# Patient Record
Sex: FEMALE | Race: WHITE | NOT HISPANIC OR LATINO | Employment: UNEMPLOYED | URBAN - METROPOLITAN AREA
[De-identification: names, ages, dates, MRNs, and addresses within clinical notes are randomized per-mention and may not be internally consistent; named-entity substitution may affect disease eponyms.]

---

## 2019-11-04 ENCOUNTER — ANNUAL EXAM (OUTPATIENT)
Dept: OBGYN CLINIC | Facility: CLINIC | Age: 59
End: 2019-11-04
Payer: COMMERCIAL

## 2019-11-04 VITALS
BODY MASS INDEX: 25.27 KG/M2 | WEIGHT: 148 LBS | HEIGHT: 64 IN | DIASTOLIC BLOOD PRESSURE: 78 MMHG | SYSTOLIC BLOOD PRESSURE: 124 MMHG

## 2019-11-04 DIAGNOSIS — Z12.39 BREAST CANCER SCREENING: ICD-10-CM

## 2019-11-04 DIAGNOSIS — Z01.419 WOMEN'S ANNUAL ROUTINE GYNECOLOGICAL EXAMINATION: Primary | ICD-10-CM

## 2019-11-04 PROCEDURE — 99396 PREV VISIT EST AGE 40-64: CPT | Performed by: OBSTETRICS & GYNECOLOGY

## 2019-11-04 NOTE — PATIENT INSTRUCTIONS
The patient was informed of a stable menopausal gyn examination  She will make arrangements to go colonoscopy  Return my office next year  She should get a Pap smear on her next year's visit

## 2019-11-04 NOTE — PROGRESS NOTES
Assessment/Plan:    The patient was informed of a stable menopausal gyn examination  A Pap smear was not performed  Patient reminded to make arrangements for colonoscopy  Her mammograms are up-to-date  Return to office in 1 year  Subjective:      Patient ID: Chyna Nicole is a 66 y o  female  HPI  This is a 70-year-old white female, she is a  5 para 2  Two prior vaginal deliveries   She is now in menopause  Menopause symptoms are under control  Her prior method of contraception includes vasectomy  She is still sexually active  She denies any major gynecological  GI complaints  She is to make arrangements for colonoscopy  Her mammograms are up-to-date  She is happy with her weight  Denies depression  She has a dentist on a regular basis  There are no new major family illnesses report except for mother has no stage IV lymphoma  The following portions of the patient's history were reviewed and updated as appropriate: allergies, current medications, past family history, past medical history, past social history, past surgical history and problem list     Review of Systems   All other systems reviewed and are negative  Objective:      /78   Ht 5' 3 5" (1 613 m)   Wt 67 1 kg (148 lb)   BMI 25 81 kg/m²          Physical Exam   Constitutional: She is oriented to person, place, and time  She appears well-developed and well-nourished  HENT:   Head: Normocephalic and atraumatic  Eyes: EOM are normal    Neck: Normal range of motion  Neck supple  No thyromegaly present  Cardiovascular: Normal rate, regular rhythm and normal heart sounds  Pulmonary/Chest: Effort normal and breath sounds normal  No stridor  No respiratory distress  She has no wheezes  She has no rales  She exhibits no tenderness  Right breast exhibits no inverted nipple, no mass, no nipple discharge, no skin change and no tenderness   Left breast exhibits no inverted nipple, no mass, no nipple discharge, no skin change and no tenderness  No breast swelling, tenderness, discharge or bleeding  Breasts are symmetrical    Abdominal: Soft  Bowel sounds are normal  She exhibits no distension and no mass  There is no tenderness  There is no rebound and no guarding  No hernia  Hernia confirmed negative in the right inguinal area and confirmed negative in the left inguinal area  Genitourinary: Rectum normal, vagina normal and uterus normal  No labial fusion  There is no rash, tenderness, lesion or injury on the right labia  There is no rash, tenderness, lesion or injury on the left labia  Uterus is not deviated, not enlarged, not fixed and not tender  Cervix exhibits no motion tenderness, no discharge and no friability  Right adnexum displays no mass, no tenderness and no fullness  Left adnexum displays no mass, no tenderness and no fullness  No erythema, tenderness or bleeding in the vagina  No foreign body in the vagina  No signs of injury around the vagina  No vaginal discharge found  Genitourinary Comments: A Pap smear was not performed   Lymphadenopathy: No inguinal adenopathy noted on the right or left side  Neurological: She is alert and oriented to person, place, and time  Skin: Skin is warm and dry  Psychiatric: She has a normal mood and affect   Her behavior is normal

## 2020-01-02 ENCOUNTER — HOSPITAL ENCOUNTER (OUTPATIENT)
Dept: BONE DENSITY | Facility: IMAGING CENTER | Age: 60
Discharge: HOME | End: 2020-01-02
Payer: COMMERCIAL

## 2020-01-02 VITALS — HEIGHT: 63 IN | BODY MASS INDEX: 26.93 KG/M2 | WEIGHT: 152 LBS

## 2020-01-02 DIAGNOSIS — Z12.39 BREAST CANCER SCREENING: ICD-10-CM

## 2020-01-02 PROCEDURE — 77067 SCR MAMMO BI INCL CAD: CPT

## 2020-01-02 PROCEDURE — 77063 BREAST TOMOSYNTHESIS BI: CPT

## 2020-01-15 ENCOUNTER — OFFICE VISIT (OUTPATIENT)
Dept: OBGYN CLINIC | Facility: CLINIC | Age: 60
End: 2020-01-15
Payer: COMMERCIAL

## 2020-01-15 VITALS
BODY MASS INDEX: 25.68 KG/M2 | DIASTOLIC BLOOD PRESSURE: 80 MMHG | WEIGHT: 150.4 LBS | SYSTOLIC BLOOD PRESSURE: 144 MMHG | HEIGHT: 64 IN

## 2020-01-15 DIAGNOSIS — R10.31 RIGHT LOWER QUADRANT PAIN: Primary | ICD-10-CM

## 2020-01-15 PROCEDURE — 99214 OFFICE O/P EST MOD 30 MIN: CPT | Performed by: OBSTETRICS & GYNECOLOGY

## 2020-01-15 RX ORDER — MELATONIN
1000 DAILY
COMMUNITY

## 2020-01-15 NOTE — PROGRESS NOTES
This is a 57-year-old white female, she is a  5 para 2 with 2 prior vaginal deliveries  She is in menopause  She is seen today complaining of right lower quadrant pain for the last 2-3 weeks  She denies any fever chills diarrhea constipation  There is no associated with sexual activity  On a pain scale is 2/10  She has no history of abdominal pelvic surgery  She has noticed slight improvement after bowel movement  There are no  a complaints  Abdominal exam is soft, positive bowel sounds, no rebound, no guarding  There was only minimal discomfort a right lower quadrant with deep palpation  Pelvic exam the external genitalia normal limits the vagina is clean the cervix is small mobile nontender there is no cervical motion tenderness  The uterus is anterior normal size completely benign unable to reproduce any pain right lower quadrant  The adnexa clear bilaterally  There is no evidence of ovarian enlargement  Impression I do not believe this is gyn in origin  There may be a GI component  Will make arrangements for a transvaginal ultrasound    If this is negative as I suspect, she will make a consultation with the GI specialist

## 2020-01-15 NOTE — PATIENT INSTRUCTIONS
Right lower quadrant pain I doubt gyn origin will order a ultrasound of the pelvis  I believe this may be GI in origin she may need to see a GI specialist for follow-up she will be informed

## 2020-01-31 ENCOUNTER — HOSPITAL ENCOUNTER (OUTPATIENT)
Dept: ULTRASOUND IMAGING | Facility: HOSPITAL | Age: 60
Discharge: HOME | End: 2020-01-31
Payer: COMMERCIAL

## 2020-01-31 DIAGNOSIS — R10.31 RIGHT LOWER QUADRANT PAIN: ICD-10-CM

## 2020-01-31 PROCEDURE — 76856 US EXAM PELVIC COMPLETE: CPT

## 2020-01-31 PROCEDURE — 76830 TRANSVAGINAL US NON-OB: CPT

## 2020-02-05 ENCOUNTER — TELEPHONE (OUTPATIENT)
Dept: OBGYN CLINIC | Facility: CLINIC | Age: 60
End: 2020-02-05

## 2021-01-07 ENCOUNTER — APPOINTMENT (EMERGENCY)
Dept: RADIOLOGY | Facility: HOSPITAL | Age: 61
End: 2021-01-07
Payer: COMMERCIAL

## 2021-01-07 ENCOUNTER — HOSPITAL ENCOUNTER (EMERGENCY)
Facility: HOSPITAL | Age: 61
Discharge: HOME | End: 2021-01-07
Attending: EMERGENCY MEDICINE | Admitting: EMERGENCY MEDICINE
Payer: COMMERCIAL

## 2021-01-07 VITALS
SYSTOLIC BLOOD PRESSURE: 150 MMHG | BODY MASS INDEX: 25.75 KG/M2 | TEMPERATURE: 97 F | DIASTOLIC BLOOD PRESSURE: 76 MMHG | WEIGHT: 150 LBS | RESPIRATION RATE: 13 BRPM | HEART RATE: 64 BPM | OXYGEN SATURATION: 98 %

## 2021-01-07 DIAGNOSIS — R07.9 CHEST PAIN: Primary | ICD-10-CM

## 2021-01-07 DIAGNOSIS — E87.6 HYPOKALEMIA: ICD-10-CM

## 2021-01-07 DIAGNOSIS — R00.2 PALPITATIONS: ICD-10-CM

## 2021-01-07 LAB
ALBUMIN SERPL BCP-MCNC: 4.4 G/DL (ref 3.5–5)
ALP SERPL-CCNC: 79 U/L (ref 46–116)
ALT SERPL W P-5'-P-CCNC: 32 U/L (ref 12–78)
ANION GAP SERPL CALCULATED.3IONS-SCNC: 9 MMOL/L (ref 4–13)
AST SERPL W P-5'-P-CCNC: 25 U/L (ref 5–45)
ATRIAL RATE: 111 BPM
BASOPHILS # BLD AUTO: 0.11 THOUSANDS/ΜL (ref 0–0.1)
BASOPHILS NFR BLD AUTO: 1 % (ref 0–1)
BILIRUB SERPL-MCNC: 0.7 MG/DL (ref 0.2–1)
BUN SERPL-MCNC: 11 MG/DL (ref 5–25)
CALCIUM SERPL-MCNC: 9.7 MG/DL (ref 8.3–10.1)
CHLORIDE SERPL-SCNC: 102 MMOL/L (ref 100–108)
CO2 SERPL-SCNC: 28 MMOL/L (ref 21–32)
CREAT SERPL-MCNC: 0.88 MG/DL (ref 0.6–1.3)
D DIMER PPP FEU-MCNC: 0.32 UG/ML FEU
EOSINOPHIL # BLD AUTO: 0.03 THOUSAND/ΜL (ref 0–0.61)
EOSINOPHIL NFR BLD AUTO: 0 % (ref 0–6)
ERYTHROCYTE [DISTWIDTH] IN BLOOD BY AUTOMATED COUNT: 11.4 % (ref 11.6–15.1)
GFR SERPL CREATININE-BSD FRML MDRD: 73 ML/MIN/1.73SQ M
GLUCOSE SERPL-MCNC: 164 MG/DL (ref 65–140)
HCT VFR BLD AUTO: 45.6 % (ref 34.8–46.1)
HGB BLD-MCNC: 15.4 G/DL (ref 11.5–15.4)
IMM GRANULOCYTES # BLD AUTO: 0.02 THOUSAND/UL (ref 0–0.2)
IMM GRANULOCYTES NFR BLD AUTO: 0 % (ref 0–2)
LYMPHOCYTES # BLD AUTO: 2.99 THOUSANDS/ΜL (ref 0.6–4.47)
LYMPHOCYTES NFR BLD AUTO: 35 % (ref 14–44)
MCH RBC QN AUTO: 33.1 PG (ref 26.8–34.3)
MCHC RBC AUTO-ENTMCNC: 33.8 G/DL (ref 31.4–37.4)
MCV RBC AUTO: 98 FL (ref 82–98)
MONOCYTES # BLD AUTO: 0.78 THOUSAND/ΜL (ref 0.17–1.22)
MONOCYTES NFR BLD AUTO: 9 % (ref 4–12)
NEUTROPHILS # BLD AUTO: 4.54 THOUSANDS/ΜL (ref 1.85–7.62)
NEUTS SEG NFR BLD AUTO: 55 % (ref 43–75)
NRBC BLD AUTO-RTO: 0 /100 WBCS
P AXIS: 75 DEGREES
PLATELET # BLD AUTO: 490 THOUSANDS/UL (ref 149–390)
PMV BLD AUTO: 9.4 FL (ref 8.9–12.7)
POTASSIUM SERPL-SCNC: 3.3 MMOL/L (ref 3.5–5.3)
PR INTERVAL: 120 MS
PROT SERPL-MCNC: 8.6 G/DL (ref 6.4–8.2)
QRS AXIS: 80 DEGREES
QRSD INTERVAL: 78 MS
QT INTERVAL: 304 MS
QTC INTERVAL: 413 MS
RBC # BLD AUTO: 4.65 MILLION/UL (ref 3.81–5.12)
SODIUM SERPL-SCNC: 139 MMOL/L (ref 136–145)
T WAVE AXIS: 83 DEGREES
TROPONIN I SERPL-MCNC: <0.02 NG/ML
VENTRICULAR RATE: 111 BPM
WBC # BLD AUTO: 8.47 THOUSAND/UL (ref 4.31–10.16)

## 2021-01-07 PROCEDURE — 80053 COMPREHEN METABOLIC PANEL: CPT | Performed by: EMERGENCY MEDICINE

## 2021-01-07 PROCEDURE — 85025 COMPLETE CBC W/AUTO DIFF WBC: CPT | Performed by: EMERGENCY MEDICINE

## 2021-01-07 PROCEDURE — 36415 COLL VENOUS BLD VENIPUNCTURE: CPT | Performed by: EMERGENCY MEDICINE

## 2021-01-07 PROCEDURE — 93010 ELECTROCARDIOGRAM REPORT: CPT | Performed by: INTERNAL MEDICINE

## 2021-01-07 PROCEDURE — 71045 X-RAY EXAM CHEST 1 VIEW: CPT

## 2021-01-07 PROCEDURE — 99285 EMERGENCY DEPT VISIT HI MDM: CPT | Performed by: EMERGENCY MEDICINE

## 2021-01-07 PROCEDURE — 99285 EMERGENCY DEPT VISIT HI MDM: CPT

## 2021-01-07 PROCEDURE — 84484 ASSAY OF TROPONIN QUANT: CPT | Performed by: EMERGENCY MEDICINE

## 2021-01-07 PROCEDURE — 93005 ELECTROCARDIOGRAM TRACING: CPT

## 2021-01-07 PROCEDURE — 85379 FIBRIN DEGRADATION QUANT: CPT | Performed by: EMERGENCY MEDICINE

## 2021-01-07 RX ORDER — MAGNESIUM HYDROXIDE/ALUMINUM HYDROXICE/SIMETHICONE 120; 1200; 1200 MG/30ML; MG/30ML; MG/30ML
30 SUSPENSION ORAL ONCE
Status: COMPLETED | OUTPATIENT
Start: 2021-01-07 | End: 2021-01-07

## 2021-01-07 RX ORDER — POTASSIUM CHLORIDE 20 MEQ/1
40 TABLET, EXTENDED RELEASE ORAL ONCE
Status: COMPLETED | OUTPATIENT
Start: 2021-01-07 | End: 2021-01-07

## 2021-01-07 RX ORDER — FAMOTIDINE 20 MG/1
20 TABLET, FILM COATED ORAL 2 TIMES DAILY
COMMUNITY

## 2021-01-07 RX ORDER — LIDOCAINE HYDROCHLORIDE 20 MG/ML
10 SOLUTION OROPHARYNGEAL ONCE
Status: COMPLETED | OUTPATIENT
Start: 2021-01-07 | End: 2021-01-07

## 2021-01-07 RX ADMIN — LIDOCAINE HYDROCHLORIDE 10 ML: 20 SOLUTION ORAL; TOPICAL at 11:18

## 2021-01-07 RX ADMIN — ALUMINUM HYDROXIDE, MAGNESIUM HYDROXIDE, AND SIMETHICONE 30 ML: 200; 200; 20 SUSPENSION ORAL at 11:18

## 2021-01-07 RX ADMIN — POTASSIUM CHLORIDE 40 MEQ: 1500 TABLET, EXTENDED RELEASE ORAL at 12:20

## 2021-01-07 NOTE — ED PROVIDER NOTES
History  Chief Complaint   Patient presents with    Palpitations     To ED with c/o chest tightness, palpitaions, fast heart rates and some upper back pain for 2 weeks  Denies any fever or chills  64year old female presents for evaluation of chest tightness and pressure radiating from the substernal chest to her back for the past 12 days  She denies shortness of breath  Patient states that she occasionally feels that her heart is beating too quickly while doing her activities  She had developed some nasal congestion around the same time, but denies cough, fevers or chills  Her daughter has similar symptoms and recently tested negative for COVID  Patient had been tested for COVID 3 days ago, but does not yet have her result  History of GERD for which she takes pepcid  History provided by:  Patient  Chest Pain  Pain location:  Substernal area  Pain quality: pressure and tightness    Pain radiates to:  Mid back  Pain radiates to the back: yes    Pain severity:  Mild  Onset quality:  Gradual  Duration:  12 days  Timing:  Constant  Progression:  Unchanged  Chronicity:  New  Relieved by:  Nothing  Worsened by:  Nothing tried  Ineffective treatments:  None tried  Associated symptoms: headache and palpitations    Associated symptoms: no abdominal pain, no cough, no fever, no nausea, no shortness of breath, not vomiting and no weakness        Prior to Admission Medications   Prescriptions Last Dose Informant Patient Reported? Taking? cholecalciferol (VITAMIN D3) 1,000 units tablet  Self Yes No   Sig: Take 1,000 Units by mouth daily   famotidine (PEPCID) 20 mg tablet  Self Yes Yes   Sig: Take 20 mg by mouth 2 (two) times a day      Facility-Administered Medications: None       Past Medical History:   Diagnosis Date    BRCA1 negative     BRCA2 negative        History reviewed  No pertinent surgical history      Family History   Problem Relation Age of Onset    Lymphoma Mother     Breast cancer Mother 52    Diabetes Father     Arthritis Father     Heart attack Maternal Grandmother     Stroke Maternal Grandmother     Breast cancer Maternal Grandfather         estimate 67    Lung cancer Maternal Grandfather     Heart disease Paternal Grandmother     Alcohol abuse Paternal Grandfather     No Known Problems Sister     No Known Problems Daughter     No Known Problems Daughter     Breast cancer Maternal Aunt         guessing early 42's    No Known Problems Maternal Aunt     No Known Problems Paternal Aunt      I have reviewed and agree with the history as documented  E-Cigarette/Vaping    E-Cigarette Use Never User      E-Cigarette/Vaping Substances    Nicotine No     Flavoring No      Social History     Tobacco Use    Smoking status: Former Smoker    Smokeless tobacco: Never Used    Tobacco comment: quit 34 years ago   Substance Use Topics    Alcohol use: Yes     Comment: socially    Drug use: Never       Review of Systems   Constitutional: Negative for chills and fever  HENT: Positive for congestion (nasal)  Negative for rhinorrhea and sore throat  Respiratory: Negative for cough, chest tightness and shortness of breath  Cardiovascular: Positive for chest pain and palpitations  Negative for leg swelling  Gastrointestinal: Negative for abdominal pain, constipation, diarrhea, nausea and vomiting  Genitourinary: Negative for dysuria, frequency and hematuria  Musculoskeletal: Negative for myalgias, neck pain and neck stiffness  Skin: Negative for pallor  Neurological: Positive for headaches  Negative for syncope and weakness  All other systems reviewed and are negative  Physical Exam  Physical Exam  Vitals signs and nursing note reviewed  Constitutional:       General: She is not in acute distress  Appearance: She is well-developed  She is not toxic-appearing or diaphoretic  HENT:      Head: Normocephalic and atraumatic     Eyes:      Conjunctiva/sclera: Conjunctivae normal       Pupils: Pupils are equal, round, and reactive to light  Neck:      Musculoskeletal: Normal range of motion and neck supple  Thyroid: No thyromegaly  Trachea: No tracheal deviation  Cardiovascular:      Rate and Rhythm: Normal rate and regular rhythm  Heart sounds: Normal heart sounds  Pulmonary:      Effort: Pulmonary effort is normal       Breath sounds: Normal breath sounds  Abdominal:      General: Bowel sounds are normal  There is no distension  Palpations: Abdomen is soft  Tenderness: There is no abdominal tenderness  Lymphadenopathy:      Cervical: No cervical adenopathy  Skin:     General: Skin is warm and dry  Neurological:      Mental Status: She is alert and oriented to person, place, and time           Vital Signs  ED Triage Vitals [01/07/21 1048]   Temperature Pulse Respirations Blood Pressure SpO2   (!) 97 °F (36 1 °C) (!) 115 20 149/89 100 %      Temp Source Heart Rate Source Patient Position - Orthostatic VS BP Location FiO2 (%)   Tympanic Monitor Lying Right arm --      Pain Score       3           Vitals:    01/07/21 1100 01/07/21 1115 01/07/21 1130 01/07/21 1200   BP:   134/72 150/76   Pulse: (!) 106 75 64 64   Patient Position - Orthostatic VS:   Lying Lying         Visual Acuity      ED Medications  Medications   Lidocaine Viscous HCl (XYLOCAINE) 2 % mucosal solution 10 mL (10 mL Swish & Swallow Given 1/7/21 1118)   aluminum-magnesium hydroxide-simethicone (MYLANTA) oral suspension 30 mL (30 mL Oral Given 1/7/21 1118)   potassium chloride (K-DUR,KLOR-CON) CR tablet 40 mEq (40 mEq Oral Given 1/7/21 1220)       Diagnostic Studies  Results Reviewed     Procedure Component Value Units Date/Time    D-Dimer [[id number]]  (Normal) Collected: 01/07/21 1108    Lab Status: Final result Specimen: Blood from Arm, Left Updated: 01/07/21 1151     D-Dimer, Quant 0 32 ug/ml FEU     Troponin I [[id number]]  (Normal) Collected: 01/07/21 1108    Lab Status: Final result Specimen: Blood from Arm, Left Updated: 01/07/21 1148     Troponin I <0 02 ng/mL     Comprehensive metabolic panel [[id number]]  (Abnormal) Collected: 01/07/21 1108    Lab Status: Final result Specimen: Blood from Arm, Left Updated: 01/07/21 1146     Sodium 139 mmol/L      Potassium 3 3 mmol/L      Chloride 102 mmol/L      CO2 28 mmol/L      ANION GAP 9 mmol/L      BUN 11 mg/dL      Creatinine 0 88 mg/dL      Glucose 164 mg/dL      Calcium 9 7 mg/dL      AST 25 U/L      ALT 32 U/L      Alkaline Phosphatase 79 U/L      Total Protein 8 6 g/dL      Albumin 4 4 g/dL      Total Bilirubin 0 70 mg/dL      eGFR 73 ml/min/1 73sq m     Narrative:      National Kidney Disease Foundation guidelines for Chronic Kidney Disease (CKD):     Stage 1 with normal or high GFR (GFR > 90 mL/min/1 73 square meters)    Stage 2 Mild CKD (GFR = 60-89 mL/min/1 73 square meters)    Stage 3A Moderate CKD (GFR = 45-59 mL/min/1 73 square meters)    Stage 3B Moderate CKD (GFR = 30-44 mL/min/1 73 square meters)    Stage 4 Severe CKD (GFR = 15-29 mL/min/1 73 square meters)    Stage 5 End Stage CKD (GFR <15 mL/min/1 73 square meters)  Note: GFR calculation is accurate only with a steady state creatinine    CBC and differential [[id number]]  (Abnormal) Collected: 01/07/21 1108    Lab Status: Final result Specimen: Blood from Arm, Left Updated: 01/07/21 1123     WBC 8 47 Thousand/uL      RBC 4 65 Million/uL      Hemoglobin 15 4 g/dL      Hematocrit 45 6 %      MCV 98 fL      MCH 33 1 pg      MCHC 33 8 g/dL      RDW 11 4 %      MPV 9 4 fL      Platelets 933 Thousands/uL      nRBC 0 /100 WBCs      Neutrophils Relative 55 %      Immat GRANS % 0 %      Lymphocytes Relative 35 %      Monocytes Relative 9 %      Eosinophils Relative 0 %      Basophils Relative 1 %      Neutrophils Absolute 4 54 Thousands/µL      Immature Grans Absolute 0 02 Thousand/uL      Lymphocytes Absolute 2 99 Thousands/µL      Monocytes Absolute 0 78 Thousand/µL Eosinophils Absolute 0 03 Thousand/µL      Basophils Absolute 0 11 Thousands/µL                  XR chest 1 view portable   ED Interpretation by Mckenzie Izaguirre MD (01/07 1221)   No acute pulmonary pathology      Final Result by Phil Stanley MD (01/07 1531)      No acute cardiopulmonary disease  Workstation performed: [id number]                    Procedures  ECG 12 Lead Documentation Only    Date/Time: 1/7/2021 12:20 PM  Performed by: Mckenzie Izaguirre MD  Authorized by: Mckenzie Izaguirre MD     Indications / Diagnosis:  Chest pain, palpitations  ECG reviewed by me, the ED Provider: yes    Patient location:  ED  Previous ECG:     Previous ECG:  Unavailable  Interpretation:     Interpretation: normal    Rate:     ECG rate:  87    ECG rate assessment: normal    Rhythm:     Rhythm: sinus rhythm      Rhythm comment:  Sinus arrhythmia  Ectopy:     Ectopy: none    QRS:     QRS axis:  Normal    QRS intervals:  Normal  Conduction:     Conduction: normal    ST segments:     ST segments:  Normal  T waves:     T waves: normal               ED Course             HEART Risk Score      Most Recent Value   Heart Score Risk Calculator   History  0 Filed at: 01/07/2021 1150   ECG  0 Filed at: 01/07/2021 1150   Age  1 Filed at: 01/07/2021 1150   Risk Factors  1 Filed at: 01/07/2021 1150   Troponin  0 Filed at: 01/07/2021 1150   HEART Score  2 Filed at: 01/07/2021 1150                      SBIRT 22yo+      Most Recent Value   SBIRT (23 yo +)   In order to provide better care to our patients, we are screening all of our patients for alcohol and drug use  Would it be okay to ask you these screening questions? Yes Filed at: 01/07/2021 1101   Initial Alcohol Screen: US AUDIT-C    1  How often do you have a drink containing alcohol? 4 Filed at: 01/07/2021 1101   2  How many drinks containing alcohol do you have on a typical day you are drinking? 1 Filed at: 01/07/2021 1101   3a  Male UNDER 65:  How often do you have five or more drinks on one occasion? 0 Filed at: 01/07/2021 1101   3b  FEMALE Any Age, or MALE 65+: How often do you have 4 or more drinks on one occassion? 0 Filed at: 01/07/2021 1101   Audit-C Score  5 Filed at: 01/07/2021 1101   VJ: How many times in the past year have you    Used an illegal drug or used a prescription medication for non-medical reasons? Never Filed at: 01/07/2021 1101          Wells' Criteria for PE      Most Recent Value   Wells' Criteria for PE   Clinical signs and symptoms of DVT  0 Filed at: 01/07/2021 1150   PE is primary diagnosis or equally likely  0 Filed at: 01/07/2021 1150   HR >100  1 5 Filed at: 01/07/2021 1150   Immobilization at least 3 days or Surgery in the previous 4 weeks  0 Filed at: 01/07/2021 1150   Previous, objectively diagnosed PE or DVT  0 Filed at: 01/07/2021 1150   Hemoptysis  0 Filed at: 01/07/2021 1150   Malignancy with treatment within 6 months or palliative  0 Filed at: 01/07/2021 1150   Wells' Criteria Total  1 5 Filed at: 01/07/2021 1150                MDM  Number of Diagnoses or Management Options  Chest pain: new and requires workup  Hypokalemia: new and requires workup  Palpitations: new and requires workup  Diagnosis management comments: 64year old female presents for evaluation of chest tightness/pressure and palpitations  EKG unremarkable  Symptoms for 12 days  COVID testing performed outpatient and is pending  Troponin negative  HEART score 2  Low risk Well's score for PE  D-dimer negative  Symptoms improved with GI cocktail  PCP follow up  Discussed return precautions with patient         Amount and/or Complexity of Data Reviewed  Clinical lab tests: ordered and reviewed  Tests in the radiology section of CPT®: ordered  Independent visualization of images, tracings, or specimens: yes    Patient Progress  Patient progress: stable      Disposition  Final diagnoses:   Chest pain   Palpitations   Hypokalemia     Time reflects when diagnosis was documented in both MDM as applicable and the Disposition within this note     Time User Action Codes Description Comment    1/7/2021 12:07 PM Denise BARRERA Add [R07 9] Chest pain     1/7/2021 12:07 PM Jose Carlos Arreaga Add [R00 2] Palpitations     1/7/2021 12:07 PM Jose Carlos Arreaga Add [E87 6] Hypokalemia       ED Disposition     ED Disposition Condition Date/Time Comment    Discharge Stable Thu Jan 7, 2021 12:21 PM Nav Barnes discharge to home/self care  Follow-up Information     Follow up With Specialties Details Why Contact Info Additional Information    Morgan Walker MD Internal Medicine Schedule an appointment as soon as possible for a visit in 1 day please schedule a virtual visit for follow up 4 [facility] Emergency Department Emergency Medicine Go to  If symptoms worsen [address] [address]  [phone] [address] ED, [address] [place] [address], [place], [facility] [place] [address]          Discharge Medication List as of 1/7/2021 12:21 PM      CONTINUE these medications which have NOT CHANGED    Details   famotidine (PEPCID) 20 mg tablet Take 20 mg by mouth 2 (two) times a day, Historical Med      cholecalciferol (VITAMIN D3) 1,000 units tablet Take 1,000 Units by mouth daily, Historical Med           No discharge procedures on file      PDMP Review     None          ED Provider  Electronically Signed by           Armida Martinez MD  01/07/21 [id number]

## 2021-01-07 NOTE — DISCHARGE INSTRUCTIONS
Chest Pain   WHAT YOU NEED TO KNOW:   Chest pain can be caused by a range of conditions, from not serious to life-threatening  Chest pain can be a symptom of a digestive problem, such as acid reflux or a stomach ulcer  An anxiety attack or a strong emotion, such as anger, can also cause chest pain  Infection, inflammation, or a fracture in the bones or cartilage in your chest can cause pain or discomfort  Sometimes chest pain or pressure is caused by poor blood flow to your heart (angina)  Chest pain may also be caused by life-threatening conditions such as a heart attack or blood clot in your lungs  DISCHARGE INSTRUCTIONS:   Call 911 if:   · You have any of the following signs of a heart attack:      ? Squeezing, pressure, or pain in your chest    ? You may  also have any of the following:     § Discomfort or pain in your back, neck, jaw, stomach, or arm    § Shortness of breath    § Nausea or vomiting    § Lightheadedness or a sudden cold sweat      Return to the emergency department if:   · You have chest discomfort that gets worse, even with medicine  · You cough or vomit blood  · Your bowel movements are black or bloody  · You cannot stop vomiting, or it hurts to swallow  Contact your healthcare provider if:   · You have questions or concerns about your condition or care  Medicines:   · Medicines  may be given to treat the cause of your chest pain  Examples include pain medicine, anxiety medicine, or medicines to increase blood flow to your heart  · Do not take certain medicines without asking your healthcare provider first   These include NSAIDs, herbal or vitamin supplements, or hormones (estrogen or progestin)  · Take your medicine as directed  Contact your healthcare provider if you think your medicine is not helping or if you have side effects  Tell him or her if you are allergic to any medicine  Keep a list of the medicines, vitamins, and herbs you take   Include the amounts, and when and why you take them  Bring the list or the pill bottles to follow-up visits  Carry your medicine list with you in case of an emergency  Follow up with your healthcare provider within 72 hours, or as directed: You may need to return for more tests to find the cause of your chest pain  You may be referred to a specialist, such as a cardiologist or gastroenterologist  Write down your questions so you remember to ask them during your visits  Healthy living tips: The following are general healthy guidelines  If your chest pain is caused by a heart problem, your healthcare provider will give you specific guidelines to follow  · Do not smoke  Nicotine and other chemicals in cigarettes and cigars can cause lung and heart damage  Ask your healthcare provider for information if you currently smoke and need help to quit  E-cigarettes or smokeless tobacco still contain nicotine  Talk to your healthcare provider before you use these products  · Eat a variety of healthy, low-fat, low-salt foods  Healthy foods include fruits, vegetables, whole-grain breads, low-fat dairy products, beans, lean meats, and fish  Ask for more information about a heart healthy diet  · Drink plenty of water every day  Your body is made of mostly water  Water helps your body to control your temperature and blood pressure  Ask your healthcare provider how much water you should drink every day  · Ask about activity  Your healthcare provider will tell you which activities to limit or avoid  Ask when you can drive, return to work, and have sex  Ask about the best exercise plan for you  · Maintain a healthy weight  Ask your healthcare provider how much you should weigh  Ask him or her to help you create a weight loss plan if you are overweight  · Get the flu and pneumonia vaccines  All adults should get the influenza (flu) vaccine  Get it every year as soon as it becomes available   The pneumococcal vaccine is given to adults aged 72 years or older  The vaccine is given every 5 years to prevent pneumococcal disease, such as pneumonia  If you have a stent:   · Carry your stent card with you at all times  · Let all healthcare providers know that you have a stent  © Copyright [address] Information is for End User's use only and may not be sold, redistributed or otherwise used for commercial purposes  All illustrations and images included in CareNotes® are the copyrighted property of A D A M , Inc  or [facility]   The above information is an  only  It is not intended as medical advice for individual conditions or treatments  Talk to your doctor, nurse or pharmacist before following any medical regimen to see if it is safe and effective for you  Hypokalemia   WHAT YOU NEED TO KNOW:   Hypokalemia is a low level of potassium in your blood  Potassium helps control how your muscles, heart, and digestive system work  Hypokalemia occurs when your body loses too much potassium or does not absorb enough from food  DISCHARGE INSTRUCTIONS:   Return to the emergency department if:   · You cannot move your arm or leg  · You have a fast or irregular heartbeat  · You are too tired or weak to stand up  Contact your healthcare provider if:   · You are vomiting, or you have diarrhea  · You have numbness or tingling in your arms or legs  · Your symptoms do not go away or they get worse  · You have questions or concerns about your condition or care  Medicines:   · Potassium  will be given to bring your potassium levels back to normal     · Take your medicine as directed  Contact your healthcare provider if you think your medicine is not helping or if you have side effects  Tell him of her if you are allergic to any medicine  Keep a list of the medicines, vitamins, and herbs you take  Include the amounts, and when and why you take them   Bring the list or the pill bottles to follow-up visits  Carry your medicine list with you in case of an emergency  Eat foods that are high in potassium:  Foods that are high in potassium include bananas, oranges, tomatoes, potatoes, and avocado  Moncada beans, turkey, salmon, lean beef, yogurt, and milk are also high in potassium  Ask your healthcare provider or dietitian for more information about foods that are high in potassium  Follow up with your healthcare provider as directed:  Write down your questions so you remember to ask them during your visits  © Copyright [address] [place] [address] Drive Information is for End User's use only and may not be sold, redistributed or otherwise used for commercial purposes  All illustrations and images included in CareNotes® are the copyrighted property of A D A M , Inc  or [facility]   The above information is an  only  It is not intended as medical advice for individual conditions or treatments  Talk to your doctor, nurse or pharmacist before following any medical regimen to see if it is safe and effective for you

## 2021-01-09 ENCOUNTER — APPOINTMENT (EMERGENCY)
Dept: NON INVASIVE DIAGNOSTICS | Facility: HOSPITAL | Age: 61
End: 2021-01-09
Payer: COMMERCIAL

## 2021-01-09 ENCOUNTER — HOSPITAL ENCOUNTER (OUTPATIENT)
Facility: HOSPITAL | Age: 61
Setting detail: OBSERVATION
Discharge: HOME | End: 2021-01-11
Attending: EMERGENCY MEDICINE | Admitting: INTERNAL MEDICINE
Payer: COMMERCIAL

## 2021-01-09 ENCOUNTER — APPOINTMENT (EMERGENCY)
Dept: RADIOLOGY | Facility: HOSPITAL | Age: 61
End: 2021-01-09
Payer: COMMERCIAL

## 2021-01-09 DIAGNOSIS — R00.2 PALPITATIONS: ICD-10-CM

## 2021-01-09 DIAGNOSIS — R07.9 CHEST PAIN: Primary | ICD-10-CM

## 2021-01-09 PROBLEM — R10.9 ABDOMINAL PAIN: Status: ACTIVE | Noted: 2021-01-09

## 2021-01-09 PROBLEM — M79.604 RIGHT LEG PAIN: Status: ACTIVE | Noted: 2021-01-09

## 2021-01-09 PROBLEM — R35.0 URINARY FREQUENCY: Status: ACTIVE | Noted: 2021-01-09

## 2021-01-09 LAB
ALBUMIN SERPL BCP-MCNC: 4 G/DL (ref 3.5–5)
ALP SERPL-CCNC: 70 U/L (ref 46–116)
ALT SERPL W P-5'-P-CCNC: 27 U/L (ref 12–78)
ANION GAP SERPL CALCULATED.3IONS-SCNC: 9 MMOL/L (ref 4–13)
APTT PPP: 32 SECONDS (ref 23–37)
AST SERPL W P-5'-P-CCNC: 24 U/L (ref 5–45)
ATRIAL RATE: 66 BPM
BACTERIA UR QL AUTO: NORMAL /HPF
BASOPHILS # BLD AUTO: 0.09 THOUSANDS/ΜL (ref 0–0.1)
BASOPHILS NFR BLD AUTO: 1 % (ref 0–1)
BILIRUB SERPL-MCNC: 0.6 MG/DL (ref 0.2–1)
BILIRUB UR QL STRIP: NEGATIVE
BUN SERPL-MCNC: 11 MG/DL (ref 5–25)
CALCIUM SERPL-MCNC: 9.5 MG/DL (ref 8.3–10.1)
CHLORIDE SERPL-SCNC: 104 MMOL/L (ref 100–108)
CLARITY UR: CLEAR
CO2 SERPL-SCNC: 26 MMOL/L (ref 21–32)
COLOR UR: YELLOW
CREAT SERPL-MCNC: 0.74 MG/DL (ref 0.6–1.3)
D DIMER PPP FEU-MCNC: <0.27 UG/ML FEU
EOSINOPHIL # BLD AUTO: 0.08 THOUSAND/ΜL (ref 0–0.61)
EOSINOPHIL NFR BLD AUTO: 1 % (ref 0–6)
ERYTHROCYTE [DISTWIDTH] IN BLOOD BY AUTOMATED COUNT: 11.3 % (ref 11.6–15.1)
GFR SERPL CREATININE-BSD FRML MDRD: 90 ML/MIN/1.73SQ M
GLUCOSE SERPL-MCNC: 135 MG/DL (ref 65–140)
GLUCOSE UR STRIP-MCNC: NEGATIVE MG/DL
HCT VFR BLD AUTO: 42.3 % (ref 34.8–46.1)
HGB BLD-MCNC: 14.6 G/DL (ref 11.5–15.4)
HGB UR QL STRIP.AUTO: ABNORMAL
IMM GRANULOCYTES # BLD AUTO: 0.02 THOUSAND/UL (ref 0–0.2)
IMM GRANULOCYTES NFR BLD AUTO: 0 % (ref 0–2)
INR PPP: 1.06 (ref 0.84–1.19)
KETONES UR STRIP-MCNC: ABNORMAL MG/DL
LEUKOCYTE ESTERASE UR QL STRIP: NEGATIVE
LIPASE SERPL-CCNC: 93 U/L (ref 73–393)
LYMPHOCYTES # BLD AUTO: 1.98 THOUSANDS/ΜL (ref 0.6–4.47)
LYMPHOCYTES NFR BLD AUTO: 24 % (ref 14–44)
MAGNESIUM SERPL-MCNC: 2.2 MG/DL (ref 1.6–2.6)
MCH RBC QN AUTO: 33.4 PG (ref 26.8–34.3)
MCHC RBC AUTO-ENTMCNC: 34.5 G/DL (ref 31.4–37.4)
MCV RBC AUTO: 97 FL (ref 82–98)
MONOCYTES # BLD AUTO: 0.66 THOUSAND/ΜL (ref 0.17–1.22)
MONOCYTES NFR BLD AUTO: 8 % (ref 4–12)
NEUTROPHILS # BLD AUTO: 5.41 THOUSANDS/ΜL (ref 1.85–7.62)
NEUTS SEG NFR BLD AUTO: 66 % (ref 43–75)
NITRITE UR QL STRIP: NEGATIVE
NON-SQ EPI CELLS URNS QL MICRO: NORMAL /HPF
NRBC BLD AUTO-RTO: 0 /100 WBCS
P AXIS: 23 DEGREES
PH UR STRIP.AUTO: 6 [PH]
PLATELET # BLD AUTO: 447 THOUSANDS/UL (ref 149–390)
PMV BLD AUTO: 9.2 FL (ref 8.9–12.7)
POTASSIUM SERPL-SCNC: 3.7 MMOL/L (ref 3.5–5.3)
PR INTERVAL: 116 MS
PROT SERPL-MCNC: 7.8 G/DL (ref 6.4–8.2)
PROT UR STRIP-MCNC: NEGATIVE MG/DL
PROTHROMBIN TIME: 13.8 SECONDS (ref 11.6–14.5)
QRS AXIS: 64 DEGREES
QRSD INTERVAL: 80 MS
QT INTERVAL: 392 MS
QTC INTERVAL: 410 MS
RBC # BLD AUTO: 4.37 MILLION/UL (ref 3.81–5.12)
RBC #/AREA URNS AUTO: NORMAL /HPF
SODIUM SERPL-SCNC: 139 MMOL/L (ref 136–145)
SP GR UR STRIP.AUTO: 1.01 (ref 1–1.03)
T WAVE AXIS: 80 DEGREES
TROPONIN I SERPL-MCNC: <0.02 NG/ML
TROPONIN I SERPL-MCNC: <0.02 NG/ML
TSH SERPL DL<=0.05 MIU/L-ACNC: 1.97 UIU/ML (ref 0.36–3.74)
TSH SERPL DL<=0.05 MIU/L-ACNC: 1.98 UIU/ML (ref 0.36–3.74)
UROBILINOGEN UR QL STRIP.AUTO: 0.2 E.U./DL
VENTRICULAR RATE: 66 BPM
WBC # BLD AUTO: 8.24 THOUSAND/UL (ref 4.31–10.16)
WBC #/AREA URNS AUTO: NORMAL /HPF

## 2021-01-09 PROCEDURE — 85610 PROTHROMBIN TIME: CPT | Performed by: EMERGENCY MEDICINE

## 2021-01-09 PROCEDURE — 84443 ASSAY THYROID STIM HORMONE: CPT | Performed by: EMERGENCY MEDICINE

## 2021-01-09 PROCEDURE — 85025 COMPLETE CBC W/AUTO DIFF WBC: CPT | Performed by: EMERGENCY MEDICINE

## 2021-01-09 PROCEDURE — 99285 EMERGENCY DEPT VISIT HI MDM: CPT

## 2021-01-09 PROCEDURE — 84484 ASSAY OF TROPONIN QUANT: CPT | Performed by: EMERGENCY MEDICINE

## 2021-01-09 PROCEDURE — 99285 EMERGENCY DEPT VISIT HI MDM: CPT | Performed by: EMERGENCY MEDICINE

## 2021-01-09 PROCEDURE — 84443 ASSAY THYROID STIM HORMONE: CPT | Performed by: PHYSICIAN ASSISTANT

## 2021-01-09 PROCEDURE — 80053 COMPREHEN METABOLIC PANEL: CPT | Performed by: EMERGENCY MEDICINE

## 2021-01-09 PROCEDURE — 71045 X-RAY EXAM CHEST 1 VIEW: CPT

## 2021-01-09 PROCEDURE — 81001 URINALYSIS AUTO W/SCOPE: CPT | Performed by: EMERGENCY MEDICINE

## 2021-01-09 PROCEDURE — 93010 ELECTROCARDIOGRAM REPORT: CPT | Performed by: INTERNAL MEDICINE

## 2021-01-09 PROCEDURE — 93971 EXTREMITY STUDY: CPT

## 2021-01-09 PROCEDURE — 83690 ASSAY OF LIPASE: CPT | Performed by: EMERGENCY MEDICINE

## 2021-01-09 PROCEDURE — 36415 COLL VENOUS BLD VENIPUNCTURE: CPT | Performed by: EMERGENCY MEDICINE

## 2021-01-09 PROCEDURE — 84484 ASSAY OF TROPONIN QUANT: CPT | Performed by: PHYSICIAN ASSISTANT

## 2021-01-09 PROCEDURE — 99219 PR INITIAL OBSERVATION CARE/DAY 50 MINUTES: CPT | Performed by: INTERNAL MEDICINE

## 2021-01-09 PROCEDURE — 93971 EXTREMITY STUDY: CPT | Performed by: SURGERY

## 2021-01-09 PROCEDURE — 93005 ELECTROCARDIOGRAM TRACING: CPT

## 2021-01-09 PROCEDURE — 85379 FIBRIN DEGRADATION QUANT: CPT | Performed by: EMERGENCY MEDICINE

## 2021-01-09 PROCEDURE — 83735 ASSAY OF MAGNESIUM: CPT | Performed by: EMERGENCY MEDICINE

## 2021-01-09 PROCEDURE — 85730 THROMBOPLASTIN TIME PARTIAL: CPT | Performed by: EMERGENCY MEDICINE

## 2021-01-09 RX ORDER — ALPRAZOLAM 0.25 MG/1
0.25 TABLET ORAL 2 TIMES DAILY PRN
Status: DISCONTINUED | OUTPATIENT
Start: 2021-01-09 | End: 2021-01-11 | Stop reason: HOSPADM

## 2021-01-09 RX ORDER — IBUPROFEN 600 MG/1
600 TABLET ORAL EVERY 8 HOURS SCHEDULED
Status: DISCONTINUED | OUTPATIENT
Start: 2021-01-09 | End: 2021-01-11 | Stop reason: HOSPADM

## 2021-01-09 RX ORDER — CALCIUM CARBONATE 200(500)MG
1000 TABLET,CHEWABLE ORAL DAILY PRN
Status: DISCONTINUED | OUTPATIENT
Start: 2021-01-09 | End: 2021-01-11 | Stop reason: HOSPADM

## 2021-01-09 RX ORDER — MELATONIN
1000 DAILY
Status: DISCONTINUED | OUTPATIENT
Start: 2021-01-09 | End: 2021-01-11 | Stop reason: HOSPADM

## 2021-01-09 RX ORDER — ASPIRIN 81 MG/1
324 TABLET, CHEWABLE ORAL ONCE
Status: COMPLETED | OUTPATIENT
Start: 2021-01-09 | End: 2021-01-09

## 2021-01-09 RX ORDER — HEPARIN SODIUM 5000 [USP'U]/ML
5000 INJECTION, SOLUTION INTRAVENOUS; SUBCUTANEOUS EVERY 8 HOURS SCHEDULED
Status: DISCONTINUED | OUTPATIENT
Start: 2021-01-10 | End: 2021-01-11 | Stop reason: HOSPADM

## 2021-01-09 RX ORDER — ONDANSETRON 2 MG/ML
4 INJECTION INTRAMUSCULAR; INTRAVENOUS EVERY 6 HOURS PRN
Status: DISCONTINUED | OUTPATIENT
Start: 2021-01-09 | End: 2021-01-11 | Stop reason: HOSPADM

## 2021-01-09 RX ORDER — FAMOTIDINE 20 MG/1
20 TABLET, FILM COATED ORAL 2 TIMES DAILY
Status: DISCONTINUED | OUTPATIENT
Start: 2021-01-09 | End: 2021-01-11 | Stop reason: HOSPADM

## 2021-01-09 RX ADMIN — CALCIUM CARBONATE (ANTACID) CHEW TAB 500 MG 1000 MG: 500 CHEW TAB at 18:37

## 2021-01-09 RX ADMIN — FAMOTIDINE 20 MG: 20 TABLET ORAL at 17:08

## 2021-01-09 RX ADMIN — ENOXAPARIN SODIUM 40 MG: 40 INJECTION SUBCUTANEOUS at 17:09

## 2021-01-09 RX ADMIN — Medication 1000 UNITS: at 17:07

## 2021-01-09 RX ADMIN — IBUPROFEN 600 MG: 600 TABLET, FILM COATED ORAL at 22:35

## 2021-01-09 RX ADMIN — ASPIRIN 81 MG CHEWABLE TABLET 324 MG: 81 TABLET CHEWABLE at 11:19

## 2021-01-09 NOTE — H&P
H&P- Sharri Davison 1960, 64 y o  female MRN: [id number]  Unit/Bed#: ED 03 Encounter: [id number]  Primary Care Provider: Annamaria Hubbard MD   Date and time admitted to hospital: 1/9/2021  6:52 AM    * Chest pain w/ palpitations   Assessment & Plan  Seen in ER 1/7 w CP and palpitations x 1 week, found to have PACs on tele   Continues with pain and presented back to ER w/ multiple c/o     R/o ACS - check trop x 3, monitor on tele   EKG from 1/7 sinus tachy and Initial EKG today negative, per ER new inferior T wave inversions however I do not concur   ELZA = 0   D-dimer normal   Continue pepcid for GERD   Recommend outpatient exercise stress test +/- echocardiogram     Right leg pain  Assessment & Plan  RLE venous duplex negative prelim   Elytes normal     Urinary frequency  Assessment & Plan  UA benign     Abdominal pain  Assessment & Plan  RUQ abdominal pain   Lipase normal   Diet as tolerated     VTE Prophylaxis: Enoxaparin (Lovenox)  / sequential compression device   Code Status: full code   POLST: POLST form is not discussed and not completed at this time  Discussion with family: pt declined     Anticipated Length of Stay:  Patient will be admitted on an Observation basis with an anticipated length of stay of  Less than 2 midnights  Justification for Hospital Stay: r/o ACS     Total Time for Visit, including Counseling / Coordination of Care: 45 minutes  Greater than 50% of this total time spent on direct patient counseling and coordination of care  Chief Complaint:   Chest pain and palpiations x 10 days     History of Present Illness:    Sharri Davison is a 64 y o  female w significant PMH positive for GERD, who presents with multiple complaints including chest pain and palpitations x10 days, right lower extremity leg pain cramping, urinary frequency, right upper quadrant abdominal pain  Patient is being admitted for chest pain ACS rule out    Patient states she was initially seen in the ER on 01/07 with sensation of irregular heart rate  States she and her family had a nonspecific viral illness for about 1 week prior to this and they were all tested negative for COVID  States on Thursday she developed pressure-like sensation in her sternum that radiated band like to the space between her shoulder blades  States the pain brought her to the emergency department however a resolved and felt more like an ache as if she did a bunch of sit-ups  Since then patient has had intermittent episodes of palpitations  States she can feel the sensation in her ears and notes 3 episodes overnight prompting another ER visit today  She also complains of twitching right lower extremity with cramping sensation and has been trying to eat more potassium  In regards to her chest pain she denies any shortness of breath or lower extremity edema  She states she has a history of mild mitral valve prolapse  States in 2004 she had a exposure to weed killer and ended up with a 30 day Holter monitor and exercise treadmill stress test that was normal   States she saw cardiologist in [place] who told her to go to yoga  States she was having digestion issues approximately 6 years ago and had an echocardiogram done at that time that was normal   States her resting heart rate is usually in the 60-62 range  She denies family history of heart disease  She reports prior history of tobacco abuse but quit 35 years ago  Does suffer from anxiety especially lately with the current state of the world around thus per the patient  Currently has an ache in the center of her chest but states that earlier this morning after coming to the ER she did have an episode of palpitations and saw that her heart rate on the monitor was in the 170s however I do not have documentation of this  Review of Systems:    Review of Systems   Constitutional: Negative for chills and fever  HENT: Negative for congestion      Respiratory: Negative for cough and shortness of breath  Cardiovascular: Positive for chest pain and palpitations  Negative for leg swelling  Gastrointestinal: Positive for abdominal pain  Genitourinary: Negative for dysuria  Musculoskeletal: Positive for arthralgias and myalgias  Skin: Negative for rash  Neurological: Negative for dizziness and light-headedness  Psychiatric/Behavioral: Negative for confusion  Past Medical and Surgical History:     Past Medical History:   Diagnosis Date    BRCA1 negative     BRCA2 negative     GERD (gastroesophageal reflux disease)     Mitral valve prolapse      History reviewed  No pertinent surgical history  Meds/Allergies:    Prior to Admission medications    Medication Sig Start Date End Date Taking? 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  Patient Pre-hospital Living Situation: independent   Patient Pre-hospital Level of Mobility: no limits   Patient Pre-hospital Diet Restrictions: none   Substance Use History:   Social History     Substance and Sexual Activity   Alcohol Use Yes    Comment: socially     Social History     Tobacco Use   Smoking Status Former Smoker   Smokeless Tobacco Never Used   Tobacco Comment    quit 34 years ago     Social History     Substance and Sexual Activity   Drug Use Never       Family History:    Family History   Problem Relation Age of Onset    Lymphoma Mother     Breast cancer Mother 46    Diabetes Father     Arthritis Father     Heart attack Maternal Grandmother     Stroke Maternal Grandmother     Breast cancer Maternal Grandfather estimate 67    Lung cancer Maternal Grandfather     Heart disease Paternal Grandmother     Alcohol abuse Paternal Grandfather     No Known Problems Sister     No Known Problems Daughter     No Known Problems Daughter     Breast cancer Maternal Aunt         guessing early 42's    No Known Problems Maternal Aunt     No Known Problems Paternal Aunt        Physical Exam:     Vitals:   Blood Pressure: 127/85 (01/09/21 1400)  Pulse: 74 (01/09/21 1400)  Temperature: 97 8 °F (36 6 °C) (01/09/21 0658)  Respirations: 20 (01/09/21 1400)  Height: 5' 4" (162 6 cm) (01/09/21 6706)  Weight - Scale: 68 9 kg (152 lb) (01/09/21 0653)  SpO2: 97 % (01/09/21 1400)    Physical Exam  Constitutional:       Appearance: Normal appearance  She is not ill-appearing  HENT:      Head: Normocephalic and atraumatic  Eyes:      Extraocular Movements: Extraocular movements intact  Neck:      Musculoskeletal: Normal range of motion and neck supple  Cardiovascular:      Rate and Rhythm: Normal rate and regular rhythm  Heart sounds: No murmur  Pulmonary:      Effort: Pulmonary effort is normal       Breath sounds: Normal breath sounds  Chest:      Chest wall: Tenderness (substernal ) present  Abdominal:      General: Abdomen is flat  Palpations: Abdomen is soft  Musculoskeletal: Normal range of motion  General: No swelling  Skin:     General: Skin is warm and dry  Neurological:      General: No focal deficit present  Mental Status: She is alert and oriented to person, place, and time  Psychiatric:         Mood and Affect: Mood normal          Behavior: Behavior normal       Comments: anxious       Additional Data:     Lab Results: I have personally reviewed pertinent reports        Results from last 7 days   Lab Units 01/09/21  0732   WBC Thousand/uL 8 24   HEMOGLOBIN g/dL 14 6   HEMATOCRIT % 42 3   PLATELETS Thousands/uL 447*   NEUTROS PCT % 66   LYMPHS PCT % 24   MONOS PCT % 8   EOS PCT % 1 Results from last 7 days   Lab Units 01/09/21  0732   SODIUM mmol/L 139   POTASSIUM mmol/L 3 7   CHLORIDE mmol/L 104   CO2 mmol/L 26   BUN mg/dL 11   CREATININE mg/dL 0 74   ANION GAP mmol/L 9   CALCIUM mg/dL 9 5   ALBUMIN g/dL 4 0   TOTAL BILIRUBIN mg/dL 0 60   ALK PHOS U/L 70   ALT U/L 27   AST U/L 24   GLUCOSE RANDOM mg/dL 135     Results from last 7 days   Lab Units 01/09/21  0732   INR  1 06                   Imaging: I have personally reviewed pertinent reports  XR chest 1 view portable   ED Interpretation by Alisia Mcgowan DO (01/09 1140)   No acute infiltrate or pneumothorax      VAS lower limb venous duplex study, unilateral/limited    (Results Pending)       EKG, Pathology, and Other Studies Reviewed on Admission:   · EKG: NSR     Allscripts / Epic Records Reviewed: Yes     ** Please Note: This note has been constructed using a voice recognition system   **

## 2021-01-09 NOTE — ED PROVIDER NOTES
History  Chief Complaint   Patient presents with    Leg Pain     patient complaint of right leg pain with "pins and needles feeling", muscle cramps overnight , and having frequent urination     This is a 80-year-old female presents with complaints of crampy  Right leg pain in the calf and upper posterior leg area since yesterday  She also complains of urinary frequency right upper quadrant pain and was  Recently seen here for palpitations and found to have PACs which are present here on the monitor  Movement pulses and gross sensation to the right leg is normal she is ambulatory into the ER without any problems  Patient also complains of chest tightness and palpitations      History provided by:  Patient  Medical Problem  Location:   right leg  Quality:   crampy pain in the calf  Severity:  Moderate  Onset quality:  Gradual  Duration:  1 day  Timing:  Constant  Progression:  Waxing and waning  Chronicity:  New  Context:   crampy right leg pain without any injury  Associated symptoms: abdominal pain ( right upper quadrant)        Prior to Admission Medications   Prescriptions Last Dose Informant Patient Reported? Taking? cholecalciferol (VITAMIN D3) 1,000 units tablet  Self Yes No   Sig: Take 1,000 Units by mouth daily   famotidine (PEPCID) 20 mg tablet  Self Yes No   Sig: Take 20 mg by mouth 2 (two) times a day      Facility-Administered Medications: None       Past Medical History:   Diagnosis Date    BRCA1 negative     BRCA2 negative     GERD (gastroesophageal reflux disease)     Mitral valve prolapse        History reviewed  No pertinent surgical history      Family History   Problem Relation Age of Onset    Lymphoma Mother     Breast cancer Mother 46    Diabetes Father     Arthritis Father     Heart attack Maternal Grandmother     Stroke Maternal Grandmother     Breast cancer Maternal Grandfather         estimate 67    Lung cancer Maternal Grandfather     Heart disease Paternal Grandmother     Alcohol abuse Paternal Grandfather     No Known Problems Sister     No Known Problems Daughter     No Known Problems Daughter     Breast cancer Maternal Aunt         guessing early 42's    No Known Problems Maternal Aunt     No Known Problems Paternal Aunt      I have reviewed and agree with the history as documented  E-Cigarette/Vaping    E-Cigarette Use Never User      E-Cigarette/Vaping Substances    Nicotine No     Flavoring No      Social History     Tobacco Use    Smoking status: Former Smoker    Smokeless tobacco: Never Used    Tobacco comment: quit 34 years ago   Substance Use Topics    Alcohol use: Yes     Comment: socially    Drug use: Never       Review of Systems   Cardiovascular: Positive for palpitations  Gastrointestinal: Positive for abdominal pain ( right upper quadrant)  Genitourinary: Positive for frequency  Musculoskeletal:        Right leg pain   All other systems reviewed and are negative  Physical Exam  Physical Exam  Vitals signs and nursing note reviewed  Constitutional:       General: She is not in acute distress  Appearance: She is not ill-appearing, toxic-appearing or diaphoretic  HENT:      Head: Normocephalic and atraumatic  Right Ear: Tympanic membrane and external ear normal       Left Ear: Tympanic membrane and external ear normal       Nose: Nose normal    Eyes:      General: No scleral icterus  Right eye: No discharge  Left eye: No discharge  Extraocular Movements: Extraocular movements intact  Pupils: Pupils are equal, round, and reactive to light  Neck:      Musculoskeletal: Normal range of motion and neck supple  No neck rigidity or muscular tenderness  Cardiovascular:      Rate and Rhythm: Normal rate and regular rhythm  Pulses: Normal pulses  Heart sounds: No murmur  No friction rub  No gallop         Comments: Occasional PACs  Pulmonary:      Effort: Pulmonary effort is normal  No respiratory distress  Breath sounds: No wheezing or rales  Abdominal:      General: Abdomen is flat  Bowel sounds are normal  There is no distension  Tenderness: There is abdominal tenderness ( right upper quadrant)  There is no guarding or rebound  Musculoskeletal: Normal range of motion  General: No swelling, tenderness, deformity or signs of injury  Right lower leg: No edema  Left lower leg: No edema  Skin:     General: Skin is warm and dry  Findings: No rash  Neurological:      General: No focal deficit present  Mental Status: She is alert and oriented to person, place, and time  Cranial Nerves: No cranial nerve deficit  Sensory: No sensory deficit  Coordination: Coordination normal    Psychiatric:         Mood and Affect: Mood normal          Behavior: Behavior normal          Thought Content:  Thought content normal          Vital Signs  ED Triage Vitals   Temperature Pulse Respirations Blood Pressure SpO2   01/09/21 0658 01/09/21 0658 01/09/21 0658 01/09/21 0658 01/09/21 0658   97 8 °F (36 6 °C) 91 18 158/75 98 %      Temp src Heart Rate Source Patient Position - Orthostatic VS BP Location FiO2 (%)   -- -- -- -- --             Pain Score       01/09/21 0657       3           Vitals:    01/09/21 0658 01/09/21 0700 01/09/21 0900 01/09/21 1100   BP: 158/75 161/82 119/75 138/79   Pulse: 91 75 64 84         Visual Acuity      ED Medications  Medications   aspirin chewable tablet 324 mg (324 mg Oral Given 1/9/21 1119)       Diagnostic Studies  Results Reviewed     Procedure Component Value Units Date/Time    D-Dimer [[id number]]  (Normal) Collected: 01/09/21 0732    Lab Status: Final result Specimen: Blood from Arm, Left Updated: 01/09/21 1251     D-Dimer, Quant <0 27 ug/ml FEU     Troponin I [[id number]]  (Normal) Collected: 01/09/21 1121    Lab Status: Final result Specimen: Blood from Arm, Left Updated: 01/09/21 1152     Troponin I <0 02 ng/mL     TSH [[id number]] (Normal) Collected: 01/09/21 0732    Lab Status: Final result Specimen: Blood from Arm, Left Updated: 01/09/21 0814     TSH 3RD GENERATON 1 965 uIU/mL     Narrative:      Patients undergoing fluorescein dye angiography may retain small amounts of fluorescein in the body for 48-72 hours post procedure  Samples containing fluorescein can produce falsely depressed TSH values  If the patient had this procedure,a specimen should be resubmitted post fluorescein clearance        Urine Microscopic [[id number]]  (Normal) Collected: 01/09/21 0733    Lab Status: Final result Specimen: Urine, Clean Catch Updated: 01/09/21 0810     RBC, UA 0-1 /hpf      WBC, UA 0-1 /hpf      Epithelial Cells Occasional /hpf      Bacteria, UA Occasional /hpf     Lipase [[id number]]  (Normal) Collected: 01/09/21 0732    Lab Status: Final result Specimen: Blood from Arm, Left Updated: 01/09/21 0805     Lipase 93 u/L     Comprehensive metabolic panel [[id number]] Collected: 01/09/21 0732    Lab Status: Final result Specimen: Blood from Arm, Left Updated: 01/09/21 0805     Sodium 139 mmol/L      Potassium 3 7 mmol/L      Chloride 104 mmol/L      CO2 26 mmol/L      ANION GAP 9 mmol/L      BUN 11 mg/dL      Creatinine 0 74 mg/dL      Glucose 135 mg/dL      Calcium 9 5 mg/dL      AST 24 U/L      ALT 27 U/L      Alkaline Phosphatase 70 U/L      Total Protein 7 8 g/dL      Albumin 4 0 g/dL      Total Bilirubin 0 60 mg/dL      eGFR 90 ml/min/1 73sq m     Narrative:      [place] guidelines for Chronic Kidney Disease (CKD):     Stage 1 with normal or high GFR (GFR > 90 mL/min/1 73 square meters)    Stage 2 Mild CKD (GFR = 60-89 mL/min/1 73 square meters)    Stage 3A Moderate CKD (GFR = 45-59 mL/min/1 73 square meters)    Stage 3B Moderate CKD (GFR = 30-44 mL/min/1 73 square meters)    Stage 4 Severe CKD (GFR = 15-29 mL/min/1 73 square meters)    Stage 5 End Stage CKD (GFR <15 mL/min/1 73 square meters)  Note: GFR calculation is accurate only with a steady state creatinine    Magnesium [[id number]]  (Normal) Collected: 01/09/21 0732    Lab Status: Final result Specimen: Blood from Arm, Left Updated: 01/09/21 0805     Magnesium 2 2 mg/dL     Protime-INR [[id number]]  (Normal) Collected: 01/09/21 0732    Lab Status: Final result Specimen: Blood from Arm, Left Updated: 01/09/21 0803     Protime 13 8 seconds      INR 1 06    APTT [[id number]]  (Normal) Collected: 01/09/21 0732    Lab Status: Final result Specimen: Blood from Arm, Left Updated: 01/09/21 0803     PTT 32 seconds     UA w Reflex to Microscopic w Reflex to Culture [[id number]]  (Abnormal) Collected: 01/09/21 0733    Lab Status: Final result Specimen: Urine, Clean Catch Updated: 01/09/21 0750     Color, UA Yellow     Clarity, UA Clear     Specific Gravity, UA 1 015     pH, UA 6 0     Leukocytes, UA Negative     Nitrite, UA Negative     Protein, UA Negative mg/dl      Glucose, UA Negative mg/dl      Ketones, UA 15 (1+) mg/dl      Urobilinogen, UA 0 2 E U /dl      Bilirubin, UA Negative     Blood, UA Trace-lysed    CBC and differential [[id number]]  (Abnormal) Collected: 01/09/21 0732    Lab Status: Final result Specimen: Blood from Arm, Left Updated: 01/09/21 0747     WBC 8 24 Thousand/uL      RBC 4 37 Million/uL      Hemoglobin 14 6 g/dL      Hematocrit 42 3 %      MCV 97 fL      MCH 33 4 pg      MCHC 34 5 g/dL      RDW 11 3 %      MPV 9 2 fL      Platelets 297 Thousands/uL      nRBC 0 /100 WBCs      Neutrophils Relative 66 %      Immat GRANS % 0 %      Lymphocytes Relative 24 %      Monocytes Relative 8 %      Eosinophils Relative 1 %      Basophils Relative 1 %      Neutrophils Absolute 5 41 Thousands/µL      Immature Grans Absolute 0 02 Thousand/uL      Lymphocytes Absolute 1 98 Thousands/µL      Monocytes Absolute 0 66 Thousand/µL      Eosinophils Absolute 0 08 Thousand/µL      Basophils Absolute 0 09 Thousands/µL                  XR chest 1 view portable   ED Interpretation by Venecia Bell Kristy Garcia DO (01/09 1140)   No acute infiltrate or pneumothorax      VAS lower limb venous duplex study, unilateral/limited    (Results Pending)              Procedures  ECG 12 Lead Documentation Only    Date/Time: 1/9/2021 10:50 AM  Performed by: Dyan Jackson DO  Authorized by: Dyan Jackson DO     ECG reviewed by me, the ED Provider: yes    Patient location:  ED  Rate:     ECG rate:  135    ECG rate assessment: tachycardic    Rhythm:     Rhythm: sinus rhythm    Ectopy:     Ectopy: PAC    T waves:     T waves: inverted      Inverted:  II, III and aVF                 ED Course  ED Course as of Jan 09 1333   Sat Jan 09, 2021   1050  Vascular study does not show any acute DVT                                SBIRT 20yo+      Most Recent Value   SBIRT (25 yo +)   In order to provide better care to our patients, we are screening all of our patients for alcohol and drug use  Would it be okay to ask you these screening questions? No Filed at: 01/09/2021 0656   Initial Alcohol Screen: US AUDIT-C    1  How often do you have a drink containing alcohol?  0 Filed at: 01/09/2021 0656   2  How many drinks containing alcohol do you have on a typical day you are drinking? 0 Filed at: 01/09/2021 0656   3a  Male UNDER 65: How often do you have five or more drinks on one occasion? 0 Filed at: 01/09/2021 0656   3b  FEMALE Any Age, or MALE 65+: How often do you have 4 or more drinks on one occassion? 0 Filed at: 01/09/2021 0656   Audit-C Score  0 Filed at: 01/09/2021 9875   VJ: How many times in the past year have you    Used an illegal drug or used a prescription medication for non-medical reasons?   Never Filed at: 01/09/2021 1229                    MDM  Number of Diagnoses or Management Options  Diagnosis management comments:  crampy right leg pain will check venous Doppler rule out DVT versus electrolyte abnormality with muscle cramps or musculoskeletal pain also urinary frequency will check urinalysis right upper quadrant pain without peritoneal findings check CBC electrolytes and lipase  Also benign PAC seen on cardiac monitor and she was evaluated prior would add TSH       Amount and/or Complexity of Data Reviewed  Clinical lab tests: ordered  Tests in the radiology section of CPT®: ordered        Disposition  Final diagnoses:   Chest pain   Palpitations     Time reflects when diagnosis was documented in both MDM as applicable and the Disposition within this note     Time User Action Codes Description Comment    1/9/2021  1:32 PM Bonnie Kline Add [R07 9] Chest pain     1/9/2021  1:33 PM Bonnie Kline Add [R00 2] Palpitations       ED Disposition     ED Disposition Condition Date/Time Comment    Admit Stable Sat Jan 9, 2021 12:25 PM Case was discussed with *Dr Jeremy Sawyer** and the patient's admission status was agreed to be Admission Status: observation status to the service of Dr Jeremy Sawyer   Follow-up Information    None         Patient's Medications   Discharge Prescriptions    No medications on file     No discharge procedures on file      PDMP Review     None          ED Provider  Electronically Signed by           Nelson Rivera DO  01/09/21 1795

## 2021-01-10 LAB
ANION GAP SERPL CALCULATED.3IONS-SCNC: 9 MMOL/L (ref 4–13)
BACTERIA UR QL AUTO: NORMAL /HPF
BILIRUB UR QL STRIP: NEGATIVE
BUN SERPL-MCNC: 14 MG/DL (ref 5–25)
CALCIUM SERPL-MCNC: 9.6 MG/DL (ref 8.3–10.1)
CHLORIDE SERPL-SCNC: 105 MMOL/L (ref 100–108)
CLARITY UR: CLEAR
CO2 SERPL-SCNC: 26 MMOL/L (ref 21–32)
COLOR UR: YELLOW
CREAT SERPL-MCNC: 0.84 MG/DL (ref 0.6–1.3)
ERYTHROCYTE [DISTWIDTH] IN BLOOD BY AUTOMATED COUNT: 11.5 % (ref 11.6–15.1)
GFR SERPL CREATININE-BSD FRML MDRD: 77 ML/MIN/1.73SQ M
GLUCOSE P FAST SERPL-MCNC: 123 MG/DL (ref 65–99)
GLUCOSE SERPL-MCNC: 123 MG/DL (ref 65–140)
GLUCOSE UR STRIP-MCNC: NEGATIVE MG/DL
HCT VFR BLD AUTO: 43 % (ref 34.8–46.1)
HGB BLD-MCNC: 14.5 G/DL (ref 11.5–15.4)
HGB UR QL STRIP.AUTO: ABNORMAL
KETONES UR STRIP-MCNC: ABNORMAL MG/DL
LEUKOCYTE ESTERASE UR QL STRIP: NEGATIVE
MAGNESIUM SERPL-MCNC: 2.2 MG/DL (ref 1.6–2.6)
MCH RBC QN AUTO: 33.3 PG (ref 26.8–34.3)
MCHC RBC AUTO-ENTMCNC: 33.7 G/DL (ref 31.4–37.4)
MCV RBC AUTO: 99 FL (ref 82–98)
NITRITE UR QL STRIP: NEGATIVE
NON-SQ EPI CELLS URNS QL MICRO: NORMAL /HPF
PH UR STRIP.AUTO: 6 [PH]
PLATELET # BLD AUTO: 456 THOUSANDS/UL (ref 149–390)
PMV BLD AUTO: 9.4 FL (ref 8.9–12.7)
POTASSIUM SERPL-SCNC: 4.1 MMOL/L (ref 3.5–5.3)
PROT UR STRIP-MCNC: ABNORMAL MG/DL
RBC # BLD AUTO: 4.36 MILLION/UL (ref 3.81–5.12)
RBC #/AREA URNS AUTO: NORMAL /HPF
SODIUM SERPL-SCNC: 140 MMOL/L (ref 136–145)
SP GR UR STRIP.AUTO: 1.02 (ref 1–1.03)
UROBILINOGEN UR QL STRIP.AUTO: 0.2 E.U./DL
WBC # BLD AUTO: 8.76 THOUSAND/UL (ref 4.31–10.16)
WBC #/AREA URNS AUTO: NORMAL /HPF

## 2021-01-10 PROCEDURE — 81001 URINALYSIS AUTO W/SCOPE: CPT | Performed by: INTERNAL MEDICINE

## 2021-01-10 PROCEDURE — 93005 ELECTROCARDIOGRAM TRACING: CPT

## 2021-01-10 PROCEDURE — 80048 BASIC METABOLIC PNL TOTAL CA: CPT | Performed by: PHYSICIAN ASSISTANT

## 2021-01-10 PROCEDURE — 85027 COMPLETE CBC AUTOMATED: CPT | Performed by: PHYSICIAN ASSISTANT

## 2021-01-10 PROCEDURE — 83735 ASSAY OF MAGNESIUM: CPT | Performed by: PHYSICIAN ASSISTANT

## 2021-01-10 PROCEDURE — 99225 PR SBSQ OBSERVATION CARE/DAY 25 MINUTES: CPT | Performed by: PHYSICIAN ASSISTANT

## 2021-01-10 RX ORDER — KETOROLAC TROMETHAMINE 30 MG/ML
15 INJECTION, SOLUTION INTRAMUSCULAR; INTRAVENOUS EVERY 6 HOURS PRN
Status: DISCONTINUED | OUTPATIENT
Start: 2021-01-10 | End: 2021-01-11 | Stop reason: HOSPADM

## 2021-01-10 RX ORDER — KETOROLAC TROMETHAMINE 30 MG/ML
15 INJECTION, SOLUTION INTRAMUSCULAR; INTRAVENOUS ONCE
Status: COMPLETED | OUTPATIENT
Start: 2021-01-10 | End: 2021-01-10

## 2021-01-10 RX ADMIN — FAMOTIDINE 20 MG: 20 TABLET ORAL at 08:41

## 2021-01-10 RX ADMIN — CALCIUM CARBONATE (ANTACID) CHEW TAB 500 MG 1000 MG: 500 CHEW TAB at 10:47

## 2021-01-10 RX ADMIN — FAMOTIDINE 20 MG: 20 TABLET ORAL at 19:12

## 2021-01-10 RX ADMIN — HEPARIN SODIUM 5000 UNITS: 5000 INJECTION INTRAVENOUS; SUBCUTANEOUS at 21:44

## 2021-01-10 RX ADMIN — HEPARIN SODIUM 5000 UNITS: 5000 INJECTION INTRAVENOUS; SUBCUTANEOUS at 13:04

## 2021-01-10 RX ADMIN — IBUPROFEN 600 MG: 600 TABLET, FILM COATED ORAL at 21:44

## 2021-01-10 RX ADMIN — Medication 1000 UNITS: at 08:41

## 2021-01-10 RX ADMIN — KETOROLAC TROMETHAMINE 15 MG: 30 INJECTION, SOLUTION INTRAMUSCULAR at 13:04

## 2021-01-10 RX ADMIN — IBUPROFEN 600 MG: 600 TABLET, FILM COATED ORAL at 13:04

## 2021-01-10 RX ADMIN — IBUPROFEN 600 MG: 600 TABLET, FILM COATED ORAL at 06:17

## 2021-01-10 RX ADMIN — ONDANSETRON 4 MG: 2 INJECTION INTRAMUSCULAR; INTRAVENOUS at 06:53

## 2021-01-10 NOTE — PLAN OF CARE
Problem: PAIN - ADULT  Goal: Verbalizes/displays adequate comfort level or baseline comfort level  Description: Interventions:  - Encourage patient to monitor pain and request assistance  - Assess pain using appropriate pain scale  - Administer analgesics based on type and severity of pain and evaluate response  - Implement non-pharmacological measures as appropriate and evaluate response  - Consider cultural and social influences on pain and pain management  - Notify physician/advanced practitioner if interventions unsuccessful or patient reports new pain  Outcome: Progressing     Problem: INFECTION - ADULT  Goal: Absence or prevention of progression during hospitalization  Description: INTERVENTIONS:  - Assess and monitor for signs and symptoms of infection  - Monitor lab/diagnostic results  - Monitor all insertion sites, i e  indwelling lines, tubes, and drains  - Monitor endotracheal if appropriate and nasal secretions for changes in amount and color  - [place] appropriate cooling/warming therapies per order  - Administer medications as ordered  - Instruct and encourage patient and family to use good hand hygiene technique  - Identify and instruct in appropriate isolation precautions for identified infection/condition  Outcome: Progressing  Goal: Absence of fever/infection during neutropenic period  Description: INTERVENTIONS:  - Monitor WBC    Outcome: Progressing     Problem: SAFETY ADULT  Goal: Patient will remain free of falls  Description: INTERVENTIONS:  - Assess patient frequently for physical needs  -  Identify cognitive and physical deficits and behaviors that affect risk of falls    -  [place] fall precautions as indicated by assessment   - Educate patient/family on patient safety including physical limitations  - Instruct patient to call for assistance with activity based on assessment  - Modify environment to reduce risk of injury  - Consider OT/PT consult to assist with strengthening/mobility  Outcome: Progressing  Goal: Maintain or return to baseline ADL function  Description: INTERVENTIONS:  -  Assess patient's ability to carry out ADLs; assess patient's baseline for ADL function and identify physical deficits which impact ability to perform ADLs (bathing, care of mouth/teeth, toileting, grooming, dressing, etc )  - Assess/evaluate cause of self-care deficits   - Assess range of motion  - Assess patient's mobility; develop plan if impaired  - Assess patient's need for assistive devices and provide as appropriate  - Encourage maximum independence but intervene and supervise when necessary  - Involve family in performance of ADLs  - Assess for home care needs following discharge   - Consider OT consult to assist with ADL evaluation and planning for discharge  - Provide patient education as appropriate  Outcome: Progressing  Goal: Maintain or return mobility status to optimal level  Description: INTERVENTIONS:  - Assess patient's baseline mobility status (ambulation, transfers, stairs, etc )    - Identify cognitive and physical deficits and behaviors that affect mobility  - Identify mobility aids required to assist with transfers and/or ambulation (gait belt, sit-to-stand, lift, walker, cane, etc )  - [place] fall precautions as indicated by assessment  - Record patient progress and toleration of activity level on Mobility SBAR; progress patient to next Phase/Stage  - Instruct patient to call for assistance with activity based on assessment  - Consider rehabilitation consult to assist with strengthening/weightbearing, etc   Outcome: Progressing     Problem: DISCHARGE PLANNING  Goal: Discharge to home or other facility with appropriate resources  Description: INTERVENTIONS:  - Identify barriers to discharge w/patient and caregiver  - Arrange for needed discharge resources and transportation as appropriate  - Identify discharge learning needs (meds, wound care, etc )  - Arrange for interpretive services to assist at discharge as needed  - Refer to Case Management Department for coordinating discharge planning if the patient needs post-hospital services based on physician/advanced practitioner order or complex needs related to functional status, cognitive ability, or social support system  Outcome: Progressing     Problem: Knowledge Deficit  Goal: Patient/family/caregiver demonstrates understanding of disease process, treatment plan, medications, and discharge instructions  Description: Complete learning assessment and assess knowledge base    Interventions:  - Provide teaching at level of understanding  - Provide teaching via preferred learning methods  Outcome: Progressing     Problem: CARDIOVASCULAR - ADULT  Goal: Maintains optimal cardiac output and hemodynamic stability  Description: INTERVENTIONS:  - Monitor I/O, vital signs and rhythm  - Monitor for S/S and trends of decreased cardiac output  - Administer and titrate ordered vasoactive medications to optimize hemodynamic stability  - Assess quality of pulses, skin color and temperature  - Assess for signs of decreased coronary artery perfusion  - Instruct patient to report change in severity of symptoms  Outcome: Progressing  Goal: Absence of cardiac dysrhythmias or at baseline rhythm  Description: INTERVENTIONS:  - Continuous cardiac monitoring, vital signs, obtain 12 lead EKG if ordered  - Administer antiarrhythmic and heart rate control medications as ordered  - Monitor electrolytes and administer replacement therapy as ordered  Outcome: Progressing

## 2021-01-10 NOTE — PROGRESS NOTES
Progress Note - Leticia Toscano 1960, 64 y o  female MRN: [id number]  Unit/Bed#: -01 Encounter: [id number]  Primary Care Provider: Kely Murphy MD   Date and time admitted to hospital: 1/9/2021  6:52 AM    * Chest pain w/ palpitations   Assessment & Plan  Seen in ER 1/7 w CP and palpitations x 1 week, found to have PACs on tele   Continues with pain and presented back to ER w/ multiple c/o 1/9    Unlikely ACS - troponin negative x 3, EKG non ischemic with sinus arrhythmia   EKG from 1/7 sinus tach   ELZA = 0   D-dimer normal   Continue pepcid for GERD   Consider costochondritis vs viral pericarditis  Started ibuprofen q6 and add PRN toradol  Check echocardiogram   Continue to monitor on tele - no episodes thus far  Consider outpatient holter vs zio patch  Right leg pain  Assessment & Plan  RLE venous duplex negative   Elytes normal   Unknown etiology  WBAT    Urinary frequency  Assessment & Plan  UA benign     Abdominal pain  Assessment & Plan  RUQ abdominal pain improving, has hx of GERD and hiatal hernia   Continue pepcid and prn tums  Lipase normal   Diet as tolerated     VTE Pharmacologic Prophylaxis:   Pharmacologic: Heparin  Mechanical VTE Prophylaxis in Place: Yes    Patient Centered Rounds: I have performed bedside rounds with nursing staff today  Discussions with Specialists or Other Care Team Provider: nursing     Education and Discussions with Family / Patient: patient     Time Spent for Care: 30 minutes  More than 50% of total time spent on counseling and coordination of care as described above      Current Length of Stay: 0 day(s)    Current Patient Status: Observation   Certification Statement: The patient will continue to require additional inpatient hospital stay due to ongoign work up for persistent chest pain and palpitations with continuous tele, pending echocardiogram and IV toradol for pain     Discharge Plan: pending likely d/c in AM i    Code Status: Level 1 - Full Code      Subjective:   Pt seen and examined at bedside  Had palpations when she got up and walked to bathroom  No sob  Reports nausea and poor oral intake  Objective:     Vitals:   Temp (24hrs), Av 2 °F (36 8 °C), Min:97 7 °F (36 5 °C), Max:98 7 °F (37 1 °C)    Temp:  [97 7 °F (36 5 °C)-98 7 °F (37 1 °C)] 98 3 °F (36 8 °C)  HR:  [54-74] 74  Resp:  [18-20] 18  BP: (120-127)/() 126/75  SpO2:  [97 %-100 %] 98 %  Body mass index is 26 09 kg/m²  Input and Output Summary (last 24 hours): Intake/Output Summary (Last 24 hours) at 1/10/2021 1153  Last data filed at 1/10/2021 1055  Gross per 24 hour   Intake    Output 150 ml   Net -150 ml       Physical Exam:     Physical Exam  Constitutional:       Appearance: Normal appearance  HENT:      Head: Normocephalic and atraumatic  Mouth/Throat:      Mouth: Mucous membranes are dry  Eyes:      Extraocular Movements: Extraocular movements intact  Neck:      Musculoskeletal: Normal range of motion and neck supple  Cardiovascular:      Rate and Rhythm: Regular rhythm  Bradycardia present  Pulmonary:      Effort: Pulmonary effort is normal       Breath sounds: Normal breath sounds  Chest:      Chest wall: Tenderness present  Abdominal:      General: Abdomen is flat  Palpations: Abdomen is soft  Musculoskeletal: Normal range of motion  General: No swelling  Skin:     General: Skin is warm  Coloration: Skin is not jaundiced  Neurological:      General: No focal deficit present  Mental Status: She is alert     Psychiatric:         Mood and Affect: Mood normal          Behavior: Behavior normal        Additional Data:     Labs:    Results from last 7 days   Lab Units 01/10/21  0653 21  0732   WBC Thousand/uL 8 76 8 24   HEMOGLOBIN g/dL 14 5 14 6   HEMATOCRIT % 43 0 42 3   PLATELETS Thousands/uL 456* 447*   NEUTROS PCT %  --  66   LYMPHS PCT %  --  24   MONOS PCT %  --  8   EOS PCT %  --  1     Results from last 7 days   Lab Units 01/10/21  0652 01/09/21  0732   SODIUM mmol/L 140 139   POTASSIUM mmol/L 4 1 3 7   CHLORIDE mmol/L 105 104   CO2 mmol/L 26 26   BUN mg/dL 14 11   CREATININE mg/dL 0 84 0 74   ANION GAP mmol/L 9 9   CALCIUM mg/dL 9 6 9 5   ALBUMIN g/dL  --  4 0   TOTAL BILIRUBIN mg/dL  --  0 60   ALK PHOS U/L  --  70   ALT U/L  --  27   AST U/L  --  24   GLUCOSE RANDOM mg/dL 123 135     Results from last 7 days   Lab Units 01/09/21  0732   INR  1 06                       * I Have Reviewed All Lab Data Listed Above  * Additional Pertinent Lab Tests Reviewed: [place] 68 Admission Reviewed    Imaging:    Imaging Reports Reviewed Today Include: all  Imaging Personally Reviewed by Myself Includes:  none    Recent Cultures (last 7 days):           Last 24 Hours Medication List:   Current Facility-Administered Medications   Medication Dose Route Frequency Provider Last Rate    ALPRAZolam  0 25 mg Oral BID PRN Dexter Quiroga PA-C      calcium carbonate  1,000 mg Oral Daily PRN Dexter Quiroga PA-C      cholecalciferol  1,000 Units Oral Daily Dexter Quiroga PA-C      famotidine  20 mg Oral BID Dexter Quiroga PA-C      heparin (porcine)  5,000 Units Subcutaneous Q8H [address] Jayme Donaldson MD      ibuprofen  600 mg Oral Q8H [address] Jayme Donaldson MD      ketorolac  15 mg Intravenous Q6H PRN Dexter Quiroga PA-C      ketorolac  15 mg Intravenous Once Dexter Quiroga PA-C      ondansetron  4 mg Intravenous Q6H PRN Francisco Quiroga PA-C          Today, Patient Was Seen By: Vin Vance PA-C    ** Please Note: Dictation voice to text software may have been used in the creation of this document   **

## 2021-01-10 NOTE — UTILIZATION REVIEW
Initial Clinical Review    Admission: Date/Time/Statement:   Admission Orders (From admission, onward)     Ordered        01/09/21 1227  Place in Observation  Once                   Orders Placed This Encounter   Procedures    Place in Observation     Standing Status:   Standing     Number of Occurrences:   1     Order Specific Question:   Admitting Physician     Answer:   Jaki Sena [[id number]]     Order Specific Question:   Level of Care     Answer:   Med Surg [[id number]]     ED Arrival Information     Expected Arrival Acuity Means of Arrival Escorted By Service Admission Type    - 1/9/2021 06:40 Urgent Walk-In Family Member Hospitalist Urgent    Arrival Complaint    Irreg Heartbeat; Leg Pain        Chief Complaint   Patient presents with    Leg Pain     patient complaint of right leg pain with "pins and needles feeling", muscle cramps overnight , and having frequent urination     Assessment/Plan: 65 yo f presents to the ED with crampy RLE pain in her calf ad upper posterior leg  She reports urinary frequency and RUQ abd pain, She had  A recent ED visit for palpitations  And was found to have PAC's, which are seen on the monitor  She is admitted to observation status to rule out ACS, likely differentials include costochondritis, pericarditis especially with her recent viral illness  Start ibuprofen q8 cariology consult       Cardiology consult - 1/10 - pending      ED Triage Vitals   Temperature Pulse Respirations Blood Pressure SpO2   01/09/21 0658 01/09/21 0658 01/09/21 0658 01/09/21 0658 01/09/21 0658   97 8 °F (36 6 °C) 91 18 158/75 98 %      Temp src Heart Rate Source Patient Position - Orthostatic VS BP Location FiO2 (%)   -- 01/09/21 1530 01/09/21 1530 01/09/21 1530 --    Monitor Lying Right arm       Pain Score       01/09/21 0657       3          Wt Readings from Last 1 Encounters:   01/09/21 68 9 kg (152 lb)     Additional Vital Signs:   Date/Time  Temp  Pulse  Resp  BP  MAP (mmHg)  SpO2  O2 Device Patient Position - Orthostatic VS   01/10/21 07:18:25  98 3 °F (36 8 °C)  74  18  126/75  92  98 %       01/09/21 22:55:24  97 7 °F (36 5 °C)  66  18  125/79  94  100 %       01/09/21 18:24:40  98 7 °F (37 1 °C)  60    127/110Abnormal   116  98 %       01/09/21 1530    54Abnormal   18  120/63  86  99 %  None (Room air)  Lying   01/09/21 1400    74  20  127/85  102  97 %       01/09/21 1100    84  20  138/79  104  99 %       01/09/21 0900    64  18  119/75  93  97 %       01/09/21 0700    75  18  161/82  110  100 %               Pertinent Labs/Diagnostic Test Results:       Results from last 7 days   Lab Units 01/09/21  0732 01/07/21  1108   WBC Thousand/uL 8 24 8 47   HEMOGLOBIN g/dL 14 6 15 4   HEMATOCRIT % 42 3 45 6   PLATELETS Thousands/uL 447* 490*   NEUTROS ABS Thousands/µL 5 41 4 54     Results from last 7 days   Lab Units 01/09/21  0732 01/07/21  1108   SODIUM mmol/L 139 139   POTASSIUM mmol/L 3 7 3 3*   CHLORIDE mmol/L 104 102   CO2 mmol/L 26 28   ANION GAP mmol/L 9 9   BUN mg/dL 11 11   CREATININE mg/dL 0 74 0 88   EGFR ml/min/1 73sq m 90 73   CALCIUM mg/dL 9 5 9 7   MAGNESIUM mg/dL 2 2  --      Results from last 7 days   Lab Units 01/09/21  0732 01/07/21  1108   AST U/L 24 25   ALT U/L 27 32   ALK PHOS U/L 70 79   TOTAL PROTEIN g/dL 7 8 8 6*   ALBUMIN g/dL 4 0 4 4   TOTAL BILIRUBIN mg/dL 0 60 0 70     Results from last 7 days   Lab Units 01/09/21  0732 01/07/21  1108   GLUCOSE RANDOM mg/dL 135 164*     Results from last 7 days   Lab Units 01/09/21  1853 01/09/21  1121 01/07/21  1108   TROPONIN I ng/mL <0 02 <0 02 <0 02     Results from last 7 days   Lab Units 01/09/21  0732 01/07/21  1108   D-DIMER QUANTITATIVE ug/ml FEU <0 27 0 32     Results from last 7 days   Lab Units 01/09/21  0732   PROTIME seconds 13 8   INR  1 06   PTT seconds 32     Results from last 7 days   Lab Units 01/09/21  0732   TSH 3RD GENERATON uIU/mL 1 983  1 965       Results from last 7 days   Lab Units 01/09/21  0732   LIPASE u/L 93       Results from last 7 days   Lab Units 01/09/21  0733   CLARITY UA  Clear   COLOR UA  Yellow   SPEC GRAV UA  1 015   PH UA  6 0   GLUCOSE UA mg/dl Negative   KETONES UA mg/dl 15 (1+)*   BLOOD UA  Trace-lysed*   PROTEIN UA mg/dl Negative   NITRITE UA  Negative   BILIRUBIN UA  Negative   UROBILINOGEN UA E U /dl 0 2   LEUKOCYTES UA  Negative   WBC UA /hpf 0-1   RBC UA /hpf 0-1   BACTERIA UA /hpf Occasional   EPITHELIAL CELLS WET PREP /hpf Occasional     CXR 1/9 - no acute  CXR 1/7 -  No acute    EKG -1/9/21  Rate:     ECG rate:  135    ECG rate assessment: tachycardic    Rhythm:     Rhythm: sinus rhythm    Ectopy:     Ectopy: PAC    T waves:     T waves: inverted      Inverted:  II, III and aVF    Vascular Study - 1/9  - no DVT in RLE     ED Treatment:   Medication Administration from 01/09/2021 0640 to 01/09/2021 1807       Date/Time Order Dose Route Action Comments     01/09/2021 1119 aspirin chewable tablet 324 mg 324 mg Oral Given      01/09/2021 1708 famotidine (PEPCID) tablet 20 mg 20 mg Oral Given      01/09/2021 1707 cholecalciferol (VITAMIN D3) tablet 1,000 Units 1,000 Units Oral Given      01/09/2021 1709 enoxaparin (LOVENOX) subcutaneous injection 40 mg 40 mg Subcutaneous Given         Past Medical History:   Diagnosis Date    BRCA1 negative     BRCA2 negative     GERD (gastroesophageal reflux disease)     Mitral valve prolapse      Present on Admission:  **None**      Admitting Diagnosis: Palpitations [R00 2]  Leg pain [M79 606]  Chest pain [R07 9]  Age/Sex: 64 y o  female         Admission Orders:  Scheduled Medications:  cholecalciferol, 1,000 Units, Oral, Daily  famotidine, 20 mg, Oral, BID  heparin (porcine), 5,000 Units, Subcutaneous, Q8H SANDRA  ibuprofen, 600 mg, Oral, Q8H SANDRA      Continuous IV Infusions:     PRN Meds:  ALPRAZolam, 0 25 mg, Oral, BID PRN  calcium carbonate, 1,000 mg, Oral, Daily PRN - 1/9 x 11   ondansetron, 4 mg, Intravenous, Q6H PRN - 1/10 x 1 Nursing Orders -  VS - Telem - up & OOB as tolerated - SCD's to le's -  Diet regular house       Network Utilization Review Department  ATTENTION: Please call with any questions or concerns to [phone] and carefully listen to the prompts so that you are directed to the right person  All voicemails are confidential   Noelle Angeles all requests for admission clinical reviews, approved or denied determinations and any other requests to dedicated fax number below belonging to the campus where the patient is receiving treatment   List of dedicated fax numbers for the Facilities:  [address] DENIALS (Administrative/Medical Necessity) [phone]   [address] (Maternity/NICU/Pediatrics) [phone]   [address] Ashley Rodriguez 8790 (Loli Maldonado "Deisy" 103) [address] [place] [address] [address] 1481 [address] [place] [address] [phone]

## 2021-01-10 NOTE — PLAN OF CARE
Problem: PAIN - ADULT  Goal: Verbalizes/displays adequate comfort level or baseline comfort level  Description: Interventions:  - Encourage patient to monitor pain and request assistance  - Assess pain using appropriate pain scale  - Administer analgesics based on type and severity of pain and evaluate response  - Implement non-pharmacological measures as appropriate and evaluate response  - Consider cultural and social influences on pain and pain management  - Notify physician/advanced practitioner if interventions unsuccessful or patient reports new pain  Outcome: Progressing     Problem: INFECTION - ADULT  Goal: Absence or prevention of progression during hospitalization  Description: INTERVENTIONS:  - Assess and monitor for signs and symptoms of infection  - Monitor lab/diagnostic results  - Monitor all insertion sites, i e  indwelling lines, tubes, and drains  - Monitor endotracheal if appropriate and nasal secretions for changes in amount and color  - [place] appropriate cooling/warming therapies per order  - Administer medications as ordered  - Instruct and encourage patient and family to use good hand hygiene technique  - Identify and instruct in appropriate isolation precautions for identified infection/condition  Outcome: Progressing  Goal: Absence of fever/infection during neutropenic period  Description: INTERVENTIONS:  - Monitor WBC    Outcome: Progressing     Problem: SAFETY ADULT  Goal: Patient will remain free of falls  Description: INTERVENTIONS:  - Assess patient frequently for physical needs  -  Identify cognitive and physical deficits and behaviors that affect risk of falls    -  [place] fall precautions as indicated by assessment   - Educate patient/family on patient safety including physical limitations  - Instruct patient to call for assistance with activity based on assessment  - Modify environment to reduce risk of injury  - Consider OT/PT consult to assist with strengthening/mobility  Outcome: Progressing  Goal: Maintain or return to baseline ADL function  Description: INTERVENTIONS:  -  Assess patient's ability to carry out ADLs; assess patient's baseline for ADL function and identify physical deficits which impact ability to perform ADLs (bathing, care of mouth/teeth, toileting, grooming, dressing, etc )  - Assess/evaluate cause of self-care deficits   - Assess range of motion  - Assess patient's mobility; develop plan if impaired  - Assess patient's need for assistive devices and provide as appropriate  - Encourage maximum independence but intervene and supervise when necessary  - Involve family in performance of ADLs  - Assess for home care needs following discharge   - Consider OT consult to assist with ADL evaluation and planning for discharge  - Provide patient education as appropriate  Outcome: Progressing  Goal: Maintain or return mobility status to optimal level  Description: INTERVENTIONS:  - Assess patient's baseline mobility status (ambulation, transfers, stairs, etc )    - Identify cognitive and physical deficits and behaviors that affect mobility  - Identify mobility aids required to assist with transfers and/or ambulation (gait belt, sit-to-stand, lift, walker, cane, etc )  - [place] fall precautions as indicated by assessment  - Record patient progress and toleration of activity level on Mobility SBAR; progress patient to next Phase/Stage  - Instruct patient to call for assistance with activity based on assessment  - Consider rehabilitation consult to assist with strengthening/weightbearing, etc   Outcome: Progressing     Problem: DISCHARGE PLANNING  Goal: Discharge to home or other facility with appropriate resources  Description: INTERVENTIONS:  - Identify barriers to discharge w/patient and caregiver  - Arrange for needed discharge resources and transportation as appropriate  - Identify discharge learning needs (meds, wound care, etc )  - Arrange for interpretive services to assist at discharge as needed  - Refer to Case Management Department for coordinating discharge planning if the patient needs post-hospital services based on physician/advanced practitioner order or complex needs related to functional status, cognitive ability, or social support system  Outcome: Progressing     Problem: Knowledge Deficit  Goal: Patient/family/caregiver demonstrates understanding of disease process, treatment plan, medications, and discharge instructions  Description: Complete learning assessment and assess knowledge base    Interventions:  - Provide teaching at level of understanding  - Provide teaching via preferred learning methods  Outcome: Progressing     Problem: CARDIOVASCULAR - ADULT  Goal: Maintains optimal cardiac output and hemodynamic stability  Description: INTERVENTIONS:  - Monitor I/O, vital signs and rhythm  - Monitor for S/S and trends of decreased cardiac output  - Administer and titrate ordered vasoactive medications to optimize hemodynamic stability  - Assess quality of pulses, skin color and temperature  - Assess for signs of decreased coronary artery perfusion  - Instruct patient to report change in severity of symptoms  Outcome: Progressing  Goal: Absence of cardiac dysrhythmias or at baseline rhythm  Description: INTERVENTIONS:  - Continuous cardiac monitoring, vital signs, obtain 12 lead EKG if ordered  - Administer antiarrhythmic and heart rate control medications as ordered  - Monitor electrolytes and administer replacement therapy as ordered  Outcome: Progressing     Problem: GASTROINTESTINAL - ADULT  Goal: Maintains adequate nutritional intake  Description: INTERVENTIONS:  - Monitor percentage of each meal consumed  - Identify factors contributing to decreased intake, treat as appropriate  - Assist with meals as needed  - Monitor I&O, weight, and lab values if indicated  - Obtain nutrition services referral as needed  Outcome: Progressing Problem: METABOLIC, FLUID AND ELECTROLYTES - ADULT  Goal: Electrolytes maintained within normal limits  Description: INTERVENTIONS:  - Monitor labs and assess patient for signs and symptoms of electrolyte imbalances  - Administer electrolyte replacement as ordered  - Monitor response to electrolyte replacements, including repeat lab results as appropriate  - Instruct patient on fluid and nutrition as appropriate  Outcome: Progressing  Goal: Fluid balance maintained  Description: INTERVENTIONS:  - Monitor labs   - Monitor I/O and WT  - Instruct patient on fluid and nutrition as appropriate  - Assess for signs & symptoms of volume excess or deficit  Outcome: Progressing     Problem: SKIN/TISSUE INTEGRITY - ADULT  Goal: Skin integrity remains intact  Description: INTERVENTIONS  - Identify patients at risk for skin breakdown  - Assess and monitor skin integrity  - Assess and monitor nutrition and hydration status  - Monitor labs (i e  albumin)  - Assess for incontinence   - Turn and reposition patient  - Assist with mobility/ambulation  - Relieve pressure over bony prominences  - Avoid friction and shearing  - Provide appropriate hygiene as needed including keeping skin clean and dry  - Evaluate need for skin moisturizer/barrier cream  - Collaborate with interdisciplinary team (i e  Nutrition, Rehabilitation, etc )   - Patient/family teaching  Outcome: Progressing     Problem: HEMATOLOGIC - ADULT  Goal: Maintains hematologic stability  Description: INTERVENTIONS  - Assess for signs and symptoms of bleeding or hemorrhage  - Monitor labs  - Administer supportive blood products/factors as ordered and appropriate  Outcome: Progressing     Problem: MUSCULOSKELETAL - ADULT  Goal: Maintain or return mobility to safest level of function  Description: INTERVENTIONS:  - Assess patient's ability to carry out ADLs; assess patient's baseline for ADL function and identify physical deficits which impact ability to perform ADLs (bathing, care of mouth/teeth, toileting, grooming, dressing, etc )  - Assess/evaluate cause of self-care deficits   - Assess range of motion  - Assess patient's mobility  - Assess patient's need for assistive devices and provide as appropriate  - Encourage maximum independence but intervene and supervise when necessary  - Involve family in performance of ADLs  - Assess for home care needs following discharge   - Consider OT consult to assist with ADL evaluation and planning for discharge  - Provide patient education as appropriate  Outcome: Progressing

## 2021-01-11 ENCOUNTER — APPOINTMENT (OUTPATIENT)
Dept: NON INVASIVE DIAGNOSTICS | Facility: HOSPITAL | Age: 61
End: 2021-01-11
Payer: COMMERCIAL

## 2021-01-11 VITALS
TEMPERATURE: 98.3 F | RESPIRATION RATE: 18 BRPM | DIASTOLIC BLOOD PRESSURE: 72 MMHG | BODY MASS INDEX: 25.95 KG/M2 | WEIGHT: 152 LBS | OXYGEN SATURATION: 99 % | HEART RATE: 63 BPM | SYSTOLIC BLOOD PRESSURE: 138 MMHG | HEIGHT: 64 IN

## 2021-01-11 PROBLEM — R42 DIZZINESS: Status: ACTIVE | Noted: 2021-01-11

## 2021-01-11 LAB
ATRIAL RATE: 76 BPM
GLUCOSE SERPL-MCNC: 118 MG/DL (ref 65–140)
MAGNESIUM SERPL-MCNC: 2.1 MG/DL (ref 1.6–2.6)
P AXIS: 68 DEGREES
PR INTERVAL: 122 MS
QRS AXIS: 52 DEGREES
QRSD INTERVAL: 84 MS
QT INTERVAL: 396 MS
QTC INTERVAL: 445 MS
T WAVE AXIS: 54 DEGREES
TROPONIN I SERPL-MCNC: <0.02 NG/ML
VENTRICULAR RATE: 76 BPM

## 2021-01-11 PROCEDURE — 83735 ASSAY OF MAGNESIUM: CPT | Performed by: INTERNAL MEDICINE

## 2021-01-11 PROCEDURE — 84484 ASSAY OF TROPONIN QUANT: CPT | Performed by: INTERNAL MEDICINE

## 2021-01-11 PROCEDURE — 93010 ELECTROCARDIOGRAM REPORT: CPT | Performed by: INTERNAL MEDICINE

## 2021-01-11 PROCEDURE — 99217 PR OBSERVATION CARE DISCHARGE MANAGEMENT: CPT | Performed by: INTERNAL MEDICINE

## 2021-01-11 PROCEDURE — RECHECK: Performed by: INTERNAL MEDICINE

## 2021-01-11 PROCEDURE — 93306 TTE W/DOPPLER COMPLETE: CPT

## 2021-01-11 PROCEDURE — 82948 REAGENT STRIP/BLOOD GLUCOSE: CPT

## 2021-01-11 PROCEDURE — 93306 TTE W/DOPPLER COMPLETE: CPT | Performed by: INTERNAL MEDICINE

## 2021-01-11 RX ORDER — MAGNESIUM SULFATE HEPTAHYDRATE 40 MG/ML
2 INJECTION, SOLUTION INTRAVENOUS ONCE
Status: COMPLETED | OUTPATIENT
Start: 2021-01-11 | End: 2021-01-11

## 2021-01-11 RX ORDER — IBUPROFEN 600 MG/1
600 TABLET ORAL EVERY 8 HOURS SCHEDULED
Qty: 30 TABLET | Refills: 0
Start: 2021-01-11 | End: 2021-01-16

## 2021-01-11 RX ADMIN — HEPARIN SODIUM 5000 UNITS: 5000 INJECTION INTRAVENOUS; SUBCUTANEOUS at 13:22

## 2021-01-11 RX ADMIN — ONDANSETRON 4 MG: 2 INJECTION INTRAMUSCULAR; INTRAVENOUS at 07:16

## 2021-01-11 RX ADMIN — KETOROLAC TROMETHAMINE 15 MG: 30 INJECTION, SOLUTION INTRAMUSCULAR; INTRAVENOUS at 07:44

## 2021-01-11 RX ADMIN — FAMOTIDINE 20 MG: 20 TABLET ORAL at 10:32

## 2021-01-11 RX ADMIN — Medication 1000 UNITS: at 10:31

## 2021-01-11 RX ADMIN — IBUPROFEN 600 MG: 600 TABLET, FILM COATED ORAL at 13:21

## 2021-01-11 RX ADMIN — MAGNESIUM SULFATE HEPTAHYDRATE 2 G: 40 INJECTION, SOLUTION INTRAVENOUS at 07:35

## 2021-01-11 RX ADMIN — IBUPROFEN 600 MG: 600 TABLET, FILM COATED ORAL at 05:46

## 2021-01-11 RX ADMIN — CALCIUM CARBONATE (ANTACID) CHEW TAB 500 MG 1000 MG: 500 CHEW TAB at 00:14

## 2021-01-11 RX ADMIN — HEPARIN SODIUM 5000 UNITS: 5000 INJECTION INTRAVENOUS; SUBCUTANEOUS at 05:46

## 2021-01-11 NOTE — QUICK NOTE
Patient reported that when she woke up felt shaky, anxious and dizzy  Patient accessed with normal vitals, no events on tele overnight  Denies chest pain  Ordered troponin level  Ordered 2 g of magnesium and zofran

## 2021-01-11 NOTE — DISCHARGE SUMMARY
Discharge- Kaylee Bates 1960, 64 y o  female MRN: [id number]    Unit/Bed#: -01 Encounter: [id number]    Primary Care Provider: Sarai Young MD   Date and time admitted to hospital: 1/9/2021  6:52 AM        * Chest pain w/ palpitations   Assessment & Plan  Seen in ER 1/7 w CP and palpitations x 1 week, found to have PACs on tele   · Presented back to ER 1/9 with multiple complaints   · Unlikely ACS - troponin negative x 3, EKG non ischemic with sinus arrhythmia   · ELZA = 0   · D-dimer normal   · Continue pepcid for GERD   · Consider costochondritis? Started ibuprofen q6h with improvement   · Echo done however pending results at time of discharge   · Continue to monitor on tele - no episodes thus far   Consider outpatient holter vs zio patch as outpatient with primary care doctor    Abdominal pain  Assessment & Plan  RUQ abdominal pain improving, has hx of GERD and hiatal hernia   · Continue pepcid and prn tums  · Lipase normal   · Diet as tolerated     Dizziness  Assessment & Plan  Likely anxiety related  · Does have mild sinus víctor while sleeping/resting on tele but no blocks and HR responds appropriately with ambulatino   · troponins normal  · Encourage ambulation  · Lytes normal    Right leg pain  Assessment & Plan  RLE venous duplex negative   · Elytes normal   · Unknown etiology  · WBAT    Urinary frequency  Assessment & Plan  · UA benign     Discharging Physician / Practitioner: Arslan Minor PA-C  PCP: Sarai Young MD  Admission Date:   Admission Orders (From admission, onward)     Ordered        01/09/21 1227  Place in Observation  Once                   Discharge Date: 01/11/21    Resolved Problems  Date Reviewed: 1/11/2021    None          Consultations During Hospital Stay:  · None    Procedures Performed:   · Echo: results pending at time of discharge  · Troponins: negative x4  · Labs: unremarkable including d-dimer    Significant Findings / Test Results:   · none    Incidental Findings:   · none     Test Results Pending at Discharge (will require follow up): · Echo     Outpatient Tests Requested:  F/u PCP (consider outpatient stress +/- zio patch)    Complications:  none    Reason for Admission: Mulitple complaints    Hospital Course:     Leticia Toscano is a 64 y o  female patient who originally presented to the hospital on 1/9/2021 due to mulitple complaints including CP, palpitations, urinary frequency, abdominal pain, GERD, and R leg pain  Workup in the hospital was normal including UA, CXR, venous duplex, d-dimer, troponins, EKGs  Echo done however results pending at time of discharge and should be followed up as outpatient  Telemetry monitoring did not reveal any arrhthymias for 48 hours  Ibuprofen recommended for next few days in case pericarditis could be playing a role  Feel some of her symptoms may be anxiety related and she was recommended to see PCP to discuss outpatient stress and Zio patch  Please see above list of diagnoses and related plan for additional information  Condition at Discharge: stable     Discharge Day Visit / Exam:     * Please refer to separate progress note for these details *    Discussion with Family: Patient  Discharge instructions/Information to patient and family:   See after visit summary for information provided to patient and family  Provisions for Follow-Up Care:  See after visit summary for information related to follow-up care and any pertinent home health orders  Disposition:     Home    For Discharges to [facility]F:   · Not Applicable to this Patient - Not Applicable to this Patient    Planned Readmission: no     Discharge Statement:  I spent 34 minutes discharging the patient  This time was spent on the day of discharge  I had direct contact with the patient on the day of discharge   Greater than 50% of the total time was spent examining patient, answering all patient questions, arranging and discussing plan of care with patient as well as directly providing post-discharge instructions  Additional time then spent on discharge activities  Discharge Medications:  See after visit summary for reconciled discharge medications provided to patient and family        ** Please Note: This note has been constructed using a voice recognition system **

## 2021-01-11 NOTE — UTILIZATION REVIEW
Continued Stay Review  1/9/2021 1227 observation     Date: 1/11/2021                     Current Patient Class: observation   Current Level of Care: med surg     HPI:60 y o  female initially admitted on 1/9/2021 to observation due to chest pain with palpitations  Presented due to right leg pain in calf and upper posterior leg since day prior to arrival as well as urinary frequency, chest tight ness and palpitations  Recent viral illness last week  Seen in ER 1/7 w CP and palpitations x 1 week, found to have PACs on tele  On exam monitor occasional PACs  abdominal tenderness RUQ  Venous doppler right leg no DVT  UA 1+ ketones  Initial EKG normal sinus rhythm, no ST or T-wave changes  Repeat EKG in the ER with T-waves in the inferior leads  In the ED given asa, Pepcid  Plan is serial troponin, telemetry, start ibuprofen  consult cardiology  1/10/2021: remains in observation : has palpitations when ambulated  Nausea and poor appetite  Telemetry sinus  Echo ordered  Ibuprofen to continue and prn Toradol ordered  Assessment/Plan: 1/11/2021- Patient feels shaky, anxious and dizzy  Telemetry sinus bradycardia  Ordered 2 grams magnesium and Zofran  Has weakness     Pertinent Labs/Diagnostic Results:   1/9/2021 venous doppler RIGHT LOWER LIMB: Limited   No gross evidence of acute deep vein thrombosis in the CFV, FV, Popliteal Vein   and calf veins    1/9/2-21 CxR No acute cardiopulmonary disease  1/9/2021 EKG Normal sinus rhythm   Normal ECG   When compared with ECG of 07-JAN-2021 10:51,   Vent   rate has decreased BY  45 BPM     Results from last 7 days   Lab Units 01/10/21  0653 01/09/21  0732 01/07/21  1108   WBC Thousand/uL 8 76 8 24 8 47   HEMOGLOBIN g/dL 14 5 14 6 15 4   HEMATOCRIT % 43 0 42 3 45 6   PLATELETS Thousands/uL 456* 447* 490*   NEUTROS ABS Thousands/µL  --  5 41 4 54     Results from last 7 days   Lab Units 01/11/21  0730 01/10/21  0652 01/09/21  0732 01/07/21  1108   SODIUM mmol/L --  140 139 139   POTASSIUM mmol/L  --  4 1 3 7 3 3*   CHLORIDE mmol/L  --  105 104 102   CO2 mmol/L  --  26 26 28   ANION GAP mmol/L  --  9 9 9   BUN mg/dL  --  14 11 11   CREATININE mg/dL  --  0 84 0 74 0 88   EGFR ml/min/1 73sq m  --  77 90 73   CALCIUM mg/dL  --  9 6 9 5 9 7   MAGNESIUM mg/dL 2 1 2 2 2 2  --      Results from last 7 days   Lab Units 01/09/21  0732 01/07/21  1108   AST U/L 24 25   ALT U/L 27 32   ALK PHOS U/L 70 79   TOTAL PROTEIN g/dL 7 8 8 6*   ALBUMIN g/dL 4 0 4 4   TOTAL BILIRUBIN mg/dL 0 60 0 70     Results from last 7 days   Lab Units 01/11/21  0638   POC GLUCOSE mg/dl 118     Results from last 7 days   Lab Units 01/10/21  0652 01/09/21  0732 01/07/21  1108   GLUCOSE RANDOM mg/dL 123 135 164*     Results from last 7 days   Lab Units 01/11/21  0730 01/09/21  1853 01/09/21  1121 01/07/21  1108   TROPONIN I ng/mL <0 02 <0 02 <0 02 <0 02     Results from last 7 days   Lab Units 01/09/21  0732 01/07/21  1108   D-DIMER QUANTITATIVE ug/ml FEU <0 27 0 32     Results from last 7 days   Lab Units 01/09/21  0732   PROTIME seconds 13 8   INR  1 06   PTT seconds 32     Results from last 7 days   Lab Units 01/09/21  0732   TSH 3RD GENERATON uIU/mL 1 983  1 965     Results from last 7 days   Lab Units 01/09/21  0732   LIPASE u/L 93     Results from last 7 days   Lab Units 01/10/21  1050 01/09/21  0733   CLARITY UA  Clear Clear   COLOR UA  Yellow Yellow   SPEC GRAV UA  1 025 1 015   PH UA  6 0 6 0   GLUCOSE UA mg/dl Negative Negative   KETONES UA mg/dl 15 (1+)* 15 (1+)*   BLOOD UA  Trace-lysed* Trace-lysed*   PROTEIN UA mg/dl Trace* Negative   NITRITE UA  Negative Negative   BILIRUBIN UA  Negative Negative   UROBILINOGEN UA E U /dl 0 2 0 2   LEUKOCYTES UA  Negative Negative   WBC UA /hpf 2-4 0-1   RBC UA /hpf 2-4 0-1   BACTERIA UA /hpf Occasional Occasional   EPITHELIAL CELLS WET PREP /hpf Occasional Occasional       Vital Signs:   01/11/21 07:07:30  98 °F (36 7 °C)  65  21  143/75  98  99 %     01/11/21 06:38:46    78    148/76  100  100 %       01/11/21 06:14:08  98 2 °F (36 8 °C)  63  19  143/76  98  99 %       01/10/21 22:44:01  98 2 °F (36 8 °C)  55    127/72  90  98 %       01/10/21 15:28:14  98 1 °F (36 7 °C)  64    128/74  92  99 %       01/10/21 07:18:25  98 3 °F (36 8 °C)  74  18  126/75  92  98 %       01/09/21 22:55:24  97 7 °F (36 5 °C)  66  18  125/79  94  100 %       01/09/21 18:24:40  98 7 °F (37 1 °C)  60    127/110Abnormal   116  98 %       01/09/21 1530    54Abnormal   18  120/63  86  99 %  None (Room air)  Lying   01/09/21 1400    74  20  127/85  102  97 %       01/09/21 1100    84  20  138/79  104  99 %       01/09/21 0900    64  18  119/75  93  97 %       01/09/21 0658  97 8 °F (36 6 °C)  91  18  158/75    98 %           Medications:   Scheduled Medications:  cholecalciferol, 1,000 Units, Oral, Daily  famotidine, 20 mg, Oral, BID  heparin (porcine), 5,000 Units, Subcutaneous, Q8H SANDRA  ibuprofen, 600 mg, Oral, Q8H [facility]  magnesium sulfate, 2 g, Intravenous, Once- 1/11/2021 at 0735    ketorolac (TORADOL) injection 15 mg   Dose: 15 mg  Freq: Once Route: IV  Start: 01/10/21 1200 End: 01/10/21 1304    Continuous IV Infusions:     PRN Meds:  ALPRAZolam, 0 25 mg, Oral, BID PRN  calcium carbonate, 1,000 mg, Oral, Daily PRN - used x 1 1/9/2021, x 1 1/10/2021, x 1 on 1/11/2021  ketorolac, 15 mg, Intravenous, Q6H PRN - used x 1 1/11/2021 (0744)  ondansetron, 4 mg, Intravenous, Q6H PRN - used x 1 on 1/10/2021, 1/11/2021 (6299, 2954)    Telemetry     Discharge Plan: to be determined  Network Utilization Review Department  ATTENTION: Please call with any questions or concerns to [phone] and carefully listen to the prompts so that you are directed to the right person   All voicemails are confidential   Nataliya Burnett all requests for admission clinical reviews, approved or denied determinations and any other requests to dedicated fax number below belonging to the campus where the patient is receiving treatment   List of dedicated fax numbers for the Facilities:  [address] [place] [address] DENIALS (Administrative/Medical Necessity) [phone]   [address]  (Maternity/NICU/Pediatrics) [phone] [address] Ashley Rodriguez 6890 (  Gonzalez Maldonado "Deisy" 103) [address] [place] [address] [address] 1481 [address] [place]) [address] [place], [place] [address] [phone]

## 2021-01-11 NOTE — NURSING NOTE
Patient was given discharge instructions and information was discussed with her  Patient was escorted via wheelchair and was picked up by her boyfriend

## 2021-01-11 NOTE — PROGRESS NOTES
Progress Note - Camila Shin 1960, 64 y o  female MRN: [id number]    Unit/Bed#: -01 Encounter: [id number]    Primary Care Provider: Digna Mclean MD   Date and time admitted to hospital: 1/9/2021  6:52 AM      * Chest pain w/ palpitations   Assessment & Plan  Seen in ER 1/7 w CP and palpitations x 1 week, found to have PACs on tele   · Presented back to ER 1/9 with multiple complaints   · Unlikely ACS - troponin negative x 3, EKG non ischemic with sinus arrhythmia   · ELZA = 0   · D-dimer normal   · Continue pepcid for GERD   · Consider costochondritis vs viral pericarditis  Started ibuprofen q6 and add PRN toradol  Denies CP today  · Check echocardiogram   · Continue to monitor on tele - no episodes thus far  Consider outpatient holter vs zio patch  · Patient aware if echo normal, will be discharged to f/u with PCP outpatient to consider outpatient stress/zio patch    Abdominal pain  Assessment & Plan  RUQ abdominal pain improving, has hx of GERD and hiatal hernia   · Continue pepcid and prn tums  · Lipase normal   · Diet as tolerated     Dizziness  Assessment & Plan  Likely anxiety related  · Does have mild sinus víctor on tele but no blocks  · troponins normal  · Encourage ambulation  · Lytes normal  · Getting Mg by night tem    Right leg pain  Assessment & Plan  RLE venous duplex negative   · Elytes normal   · Unknown etiology  · WBAT    Urinary frequency  Assessment & Plan  · UA benign       VTE Pharmacologic Prophylaxis:   Pharmacologic: Heparin  Mechanical VTE Prophylaxis in Place: Yes    Patient Centered Rounds: I have performed bedside rounds with nursing staff today  CASEY BALDERAS    Discussions with Specialists or Other Care Team Provider: none    Education and Discussions with Family / Patient: patient  Time Spent for Care: 30 minutes  More than 50% of total time spent on counseling and coordination of care as described above      Current Length of Stay: 0 day(s)    Current Patient Status: Observation   Certification Statement: if echo normal will discharge today    Discharge Plan: if echo normal will discharge today    Code Status: Level 1 - Full Code      Subjective:   Pt reports she is "not doing well " Complaints of generalized weakness, dizziness  No chest pain, palpitations, nausea or vomiting  Denies any abdominal pain  Appetite poor  Objective:     Vitals:   Temp (24hrs), Av 1 °F (36 7 °C), Min:98 °F (36 7 °C), Max:98 2 °F (36 8 °C)    Temp:  [98 °F (36 7 °C)-98 2 °F (36 8 °C)] 98 °F (36 7 °C)  HR:  [55-78] 65  Resp:  [19-21] 21  BP: (127-148)/(72-76) 143/75  SpO2:  [98 %-100 %] 99 %  Body mass index is 26 09 kg/m²  Input and Output Summary (last 24 hours): Intake/Output Summary (Last 24 hours) at 2021 0731  Last data filed at 1/10/2021 1657  Gross per 24 hour   Intake 220 ml   Output 450 ml   Net -230 ml       Physical Exam:     Physical Exam  Vitals signs and nursing note reviewed  Constitutional:       Appearance: She is not ill-appearing  Comments: Keeps eyes mainly closed during exam    Cardiovascular:      Rate and Rhythm: Normal rate  Heart sounds: No murmur  Pulmonary:      Effort: No respiratory distress  Abdominal:      General: There is no distension  Tenderness: There is no abdominal tenderness  Musculoskeletal:      Right lower leg: No edema  Left lower leg: No edema  Skin:     Coloration: Skin is pale  Neurological:      Mental Status: She is oriented to person, place, and time        Comments: Moves all extremities          Additional Data:     Labs:    Results from last 7 days   Lab Units 01/10/21  0653 21  0732   WBC Thousand/uL 8 76 8 24   HEMOGLOBIN g/dL 14 5 14 6   HEMATOCRIT % 43 0 42 3   PLATELETS Thousands/uL 456* 447*   NEUTROS PCT %  --  66   LYMPHS PCT %  --  24   MONOS PCT %  --  8   EOS PCT %  --  1     Results from last 7 days   Lab Units 01/10/21  0652 21  0732   POTASSIUM mmol/L 4 1 3 7   CHLORIDE mmol/L 105 104   CO2 mmol/L 26 26   BUN mg/dL 14 11   CREATININE mg/dL 0 84 0 74   CALCIUM mg/dL 9 6 9 5   ALK PHOS U/L  --  70   ALT U/L  --  27   AST U/L  --  24     Results from last 7 days   Lab Units 01/09/21  0732   INR  1 06       * I Have Reviewed All Lab Data Listed Above  * Additional Pertinent Lab Tests Reviewed: All Labs Within Last 24 Hours Reviewed    Imaging:    Imaging Reports Reviewed Today Include: all  Imaging Personally Reviewed by Myself Includes:  none    Recent Cultures (last 7 days):           Last 24 Hours Medication List:   Current Facility-Administered Medications   Medication Dose Route Frequency Provider Last Rate    ALPRAZolam  0 25 mg Oral BID PRN Dexter Quiroga PA-C      calcium carbonate  1,000 mg Oral Daily PRN Grant Quiroga PA-C      cholecalciferol  1,000 Units Oral Daily Dexter Quiroga PA-C      famotidine  20 mg Oral BID Dexter Quiroga PA-C      heparin (porcine)  5,000 Units Subcutaneous Q8H [address] Jayme Ibarra MD      ibuprofen  600 mg Oral Q8H [address] Jayme Ibarra MD      ketorolac  15 mg Intravenous Q6H PRN Dexter Quiroga PA-C      magnesium sulfate  2 g Intravenous Once Jasno Velazquez PA-C      ondansetron  4 mg Intravenous Q6H PRN Gratn Quiroga PA-C          Today, Patient Was Seen By: Pal Perry PA-C    ** Please Note: Dictation voice to text software may have been used in the creation of this document   **

## 2021-01-11 NOTE — DISCHARGE INSTRUCTIONS
Please keep your follow up appointment with your primary care doctor  Please be aware echo is pending at this time  Will contact you if any significant abnormalities  You had normal EKG, 4 normal troponin tests, normal d-dimer while here in hospital  Venous duplex and CXR were also negative       Should discuss outpatient stress test and Zio Patch since hospital workup has been normal   Would also consider discussing possible anxiety treatment if those tests are also normal

## 2021-01-11 NOTE — PLAN OF CARE
Problem: PAIN - ADULT  Goal: Verbalizes/displays adequate comfort level or baseline comfort level  Description: Interventions:  - Encourage patient to monitor pain and request assistance  - Assess pain using appropriate pain scale  - Administer analgesics based on type and severity of pain and evaluate response  - Implement non-pharmacological measures as appropriate and evaluate response  - Consider cultural and social influences on pain and pain management  - Notify physician/advanced practitioner if interventions unsuccessful or patient reports new pain  Outcome: Adequate for Discharge     Problem: INFECTION - ADULT  Goal: Absence or prevention of progression during hospitalization  Description: INTERVENTIONS:  - Assess and monitor for signs and symptoms of infection  - Monitor lab/diagnostic results  - Monitor all insertion sites, i e  indwelling lines, tubes, and drains  - Monitor endotracheal if appropriate and nasal secretions for changes in amount and color  - [place] appropriate cooling/warming therapies per order  - Administer medications as ordered  - Instruct and encourage patient and family to use good hand hygiene technique  - Identify and instruct in appropriate isolation precautions for identified infection/condition  Outcome: Adequate for Discharge  Goal: Absence of fever/infection during neutropenic period  Description: INTERVENTIONS:  - Monitor WBC    Outcome: Adequate for Discharge     Problem: SAFETY ADULT  Goal: Patient will remain free of falls  Description: INTERVENTIONS:  - Assess patient frequently for physical needs  -  Identify cognitive and physical deficits and behaviors that affect risk of falls    -  [place] fall precautions as indicated by assessment   - Educate patient/family on patient safety including physical limitations  - Instruct patient to call for assistance with activity based on assessment  - Modify environment to reduce risk of injury  - Consider OT/PT consult to assist with strengthening/mobility  Outcome: Adequate for Discharge  Goal: Maintain or return to baseline ADL function  Description: INTERVENTIONS:  -  Assess patient's ability to carry out ADLs; assess patient's baseline for ADL function and identify physical deficits which impact ability to perform ADLs (bathing, care of mouth/teeth, toileting, grooming, dressing, etc )  - Assess/evaluate cause of self-care deficits   - Assess range of motion  - Assess patient's mobility; develop plan if impaired  - Assess patient's need for assistive devices and provide as appropriate  - Encourage maximum independence but intervene and supervise when necessary  - Involve family in performance of ADLs  - Assess for home care needs following discharge   - Consider OT consult to assist with ADL evaluation and planning for discharge  - Provide patient education as appropriate  Outcome: Adequate for Discharge  Goal: Maintain or return mobility status to optimal level  Description: INTERVENTIONS:  - Assess patient's baseline mobility status (ambulation, transfers, stairs, etc )    - Identify cognitive and physical deficits and behaviors that affect mobility  - Identify mobility aids required to assist with transfers and/or ambulation (gait belt, sit-to-stand, lift, walker, cane, etc )  - [place] fall precautions as indicated by assessment  - Record patient progress and toleration of activity level on Mobility SBAR; progress patient to next Phase/Stage  - Instruct patient to call for assistance with activity based on assessment  - Consider rehabilitation consult to assist with strengthening/weightbearing, etc   Outcome: Adequate for Discharge     Problem: DISCHARGE PLANNING  Goal: Discharge to home or other facility with appropriate resources  Description: INTERVENTIONS:  - Identify barriers to discharge w/patient and caregiver  - Arrange for needed discharge resources and transportation as appropriate  - Identify discharge learning needs (meds, wound care, etc )  - Arrange for interpretive services to assist at discharge as needed  - Refer to Case Management Department for coordinating discharge planning if the patient needs post-hospital services based on physician/advanced practitioner order or complex needs related to functional status, cognitive ability, or social support system  Outcome: Adequate for Discharge     Problem: Knowledge Deficit  Goal: Patient/family/caregiver demonstrates understanding of disease process, treatment plan, medications, and discharge instructions  Description: Complete learning assessment and assess knowledge base    Interventions:  - Provide teaching at level of understanding  - Provide teaching via preferred learning methods  Outcome: Adequate for Discharge     Problem: CARDIOVASCULAR - ADULT  Goal: Maintains optimal cardiac output and hemodynamic stability  Description: INTERVENTIONS:  - Monitor I/O, vital signs and rhythm  - Monitor for S/S and trends of decreased cardiac output  - Administer and titrate ordered vasoactive medications to optimize hemodynamic stability  - Assess quality of pulses, skin color and temperature  - Assess for signs of decreased coronary artery perfusion  - Instruct patient to report change in severity of symptoms  Outcome: Adequate for Discharge  Goal: Absence of cardiac dysrhythmias or at baseline rhythm  Description: INTERVENTIONS:  - Continuous cardiac monitoring, vital signs, obtain 12 lead EKG if ordered  - Administer antiarrhythmic and heart rate control medications as ordered  - Monitor electrolytes and administer replacement therapy as ordered  Outcome: Adequate for Discharge     Problem: GASTROINTESTINAL - ADULT  Goal: Maintains adequate nutritional intake  Description: INTERVENTIONS:  - Monitor percentage of each meal consumed  - Identify factors contributing to decreased intake, treat as appropriate  - Assist with meals as needed  - Monitor I&O, weight, and lab values if indicated  - Obtain nutrition services referral as needed  Outcome: Adequate for Discharge     Problem: METABOLIC, FLUID AND ELECTROLYTES - ADULT  Goal: Electrolytes maintained within normal limits  Description: INTERVENTIONS:  - Monitor labs and assess patient for signs and symptoms of electrolyte imbalances  - Administer electrolyte replacement as ordered  - Monitor response to electrolyte replacements, including repeat lab results as appropriate  - Instruct patient on fluid and nutrition as appropriate  Outcome: Adequate for Discharge  Goal: Fluid balance maintained  Description: INTERVENTIONS:  - Monitor labs   - Monitor I/O and WT  - Instruct patient on fluid and nutrition as appropriate  - Assess for signs & symptoms of volume excess or deficit  Outcome: Adequate for Discharge     Problem: SKIN/TISSUE INTEGRITY - ADULT  Goal: Skin integrity remains intact  Description: INTERVENTIONS  - Identify patients at risk for skin breakdown  - Assess and monitor skin integrity  - Assess and monitor nutrition and hydration status  - Monitor labs (i e  albumin)  - Assess for incontinence   - Turn and reposition patient  - Assist with mobility/ambulation  - Relieve pressure over bony prominences  - Avoid friction and shearing  - Provide appropriate hygiene as needed including keeping skin clean and dry  - Evaluate need for skin moisturizer/barrier cream  - Collaborate with interdisciplinary team (i e  Nutrition, Rehabilitation, etc )   - Patient/family teaching  Outcome: Adequate for Discharge     Problem: HEMATOLOGIC - ADULT  Goal: Maintains hematologic stability  Description: INTERVENTIONS  - Assess for signs and symptoms of bleeding or hemorrhage  - Monitor labs  - Administer supportive blood products/factors as ordered and appropriate  Outcome: Adequate for Discharge     Problem: MUSCULOSKELETAL - ADULT  Goal: Maintain or return mobility to safest level of function  Description: INTERVENTIONS:  - Assess patient's ability to carry out ADLs; assess patient's baseline for ADL function and identify physical deficits which impact ability to perform ADLs (bathing, care of mouth/teeth, toileting, grooming, dressing, etc )  - Assess/evaluate cause of self-care deficits   - Assess range of motion  - Assess patient's mobility  - Assess patient's need for assistive devices and provide as appropriate  - Encourage maximum independence but intervene and supervise when necessary  - Involve family in performance of ADLs  - Assess for home care needs following discharge   - Consider OT consult to assist with ADL evaluation and planning for discharge  - Provide patient education as appropriate  Outcome: Adequate for Discharge

## 2021-01-11 NOTE — PLAN OF CARE
Problem: PAIN - ADULT  Goal: Verbalizes/displays adequate comfort level or baseline comfort level  Description: Interventions:  - Encourage patient to monitor pain and request assistance  - Assess pain using appropriate pain scale  - Administer analgesics based on type and severity of pain and evaluate response  - Implement non-pharmacological measures as appropriate and evaluate response  - Consider cultural and social influences on pain and pain management  - Notify physician/advanced practitioner if interventions unsuccessful or patient reports new pain  Outcome: Progressing     Problem: INFECTION - ADULT  Goal: Absence or prevention of progression during hospitalization  Description: INTERVENTIONS:  - Assess and monitor for signs and symptoms of infection  - Monitor lab/diagnostic results  - Monitor all insertion sites, i e  indwelling lines, tubes, and drains  - Monitor endotracheal if appropriate and nasal secretions for changes in amount and color  - [place] appropriate cooling/warming therapies per order  - Administer medications as ordered  - Instruct and encourage patient and family to use good hand hygiene technique  - Identify and instruct in appropriate isolation precautions for identified infection/condition  Outcome: Progressing  Goal: Absence of fever/infection during neutropenic period  Description: INTERVENTIONS:  - Monitor WBC    Outcome: Progressing     Problem: SAFETY ADULT  Goal: Patient will remain free of falls  Description: INTERVENTIONS:  - Assess patient frequently for physical needs  -  Identify cognitive and physical deficits and behaviors that affect risk of falls    -  [place] fall precautions as indicated by assessment   - Educate patient/family on patient safety including physical limitations  - Instruct patient to call for assistance with activity based on assessment  - Modify environment to reduce risk of injury  - Consider OT/PT consult to assist with strengthening/mobility  Outcome: Progressing  Goal: Maintain or return to baseline ADL function  Description: INTERVENTIONS:  -  Assess patient's ability to carry out ADLs; assess patient's baseline for ADL function and identify physical deficits which impact ability to perform ADLs (bathing, care of mouth/teeth, toileting, grooming, dressing, etc )  - Assess/evaluate cause of self-care deficits   - Assess range of motion  - Assess patient's mobility; develop plan if impaired  - Assess patient's need for assistive devices and provide as appropriate  - Encourage maximum independence but intervene and supervise when necessary  - Involve family in performance of ADLs  - Assess for home care needs following discharge   - Consider OT consult to assist with ADL evaluation and planning for discharge  - Provide patient education as appropriate  Outcome: Progressing  Goal: Maintain or return mobility status to optimal level  Description: INTERVENTIONS:  - Assess patient's baseline mobility status (ambulation, transfers, stairs, etc )    - Identify cognitive and physical deficits and behaviors that affect mobility  - Identify mobility aids required to assist with transfers and/or ambulation (gait belt, sit-to-stand, lift, walker, cane, etc )  - [place] fall precautions as indicated by assessment  - Record patient progress and toleration of activity level on Mobility SBAR; progress patient to next Phase/Stage  - Instruct patient to call for assistance with activity based on assessment  - Consider rehabilitation consult to assist with strengthening/weightbearing, etc   Outcome: Progressing     Problem: DISCHARGE PLANNING  Goal: Discharge to home or other facility with appropriate resources  Description: INTERVENTIONS:  - Identify barriers to discharge w/patient and caregiver  - Arrange for needed discharge resources and transportation as appropriate  - Identify discharge learning needs (meds, wound care, etc )  - Arrange for interpretive services to assist at discharge as needed  - Refer to Case Management Department for coordinating discharge planning if the patient needs post-hospital services based on physician/advanced practitioner order or complex needs related to functional status, cognitive ability, or social support system  Outcome: Progressing     Problem: Knowledge Deficit  Goal: Patient/family/caregiver demonstrates understanding of disease process, treatment plan, medications, and discharge instructions  Description: Complete learning assessment and assess knowledge base    Interventions:  - Provide teaching at level of understanding  - Provide teaching via preferred learning methods  Outcome: Progressing     Problem: CARDIOVASCULAR - ADULT  Goal: Maintains optimal cardiac output and hemodynamic stability  Description: INTERVENTIONS:  - Monitor I/O, vital signs and rhythm  - Monitor for S/S and trends of decreased cardiac output  - Administer and titrate ordered vasoactive medications to optimize hemodynamic stability  - Assess quality of pulses, skin color and temperature  - Assess for signs of decreased coronary artery perfusion  - Instruct patient to report change in severity of symptoms  Outcome: Progressing  Goal: Absence of cardiac dysrhythmias or at baseline rhythm  Description: INTERVENTIONS:  - Continuous cardiac monitoring, vital signs, obtain 12 lead EKG if ordered  - Administer antiarrhythmic and heart rate control medications as ordered  - Monitor electrolytes and administer replacement therapy as ordered  Outcome: Progressing     Problem: GASTROINTESTINAL - ADULT  Goal: Maintains adequate nutritional intake  Description: INTERVENTIONS:  - Monitor percentage of each meal consumed  - Identify factors contributing to decreased intake, treat as appropriate  - Assist with meals as needed  - Monitor I&O, weight, and lab values if indicated  - Obtain nutrition services referral as needed  Outcome: Progressing Problem: METABOLIC, FLUID AND ELECTROLYTES - ADULT  Goal: Electrolytes maintained within normal limits  Description: INTERVENTIONS:  - Monitor labs and assess patient for signs and symptoms of electrolyte imbalances  - Administer electrolyte replacement as ordered  - Monitor response to electrolyte replacements, including repeat lab results as appropriate  - Instruct patient on fluid and nutrition as appropriate  Outcome: Progressing  Goal: Fluid balance maintained  Description: INTERVENTIONS:  - Monitor labs   - Monitor I/O and WT  - Instruct patient on fluid and nutrition as appropriate  - Assess for signs & symptoms of volume excess or deficit  Outcome: Progressing     Problem: SKIN/TISSUE INTEGRITY - ADULT  Goal: Skin integrity remains intact  Description: INTERVENTIONS  - Identify patients at risk for skin breakdown  - Assess and monitor skin integrity  - Assess and monitor nutrition and hydration status  - Monitor labs (i e  albumin)  - Assess for incontinence   - Turn and reposition patient  - Assist with mobility/ambulation  - Relieve pressure over bony prominences  - Avoid friction and shearing  - Provide appropriate hygiene as needed including keeping skin clean and dry  - Evaluate need for skin moisturizer/barrier cream  - Collaborate with interdisciplinary team (i e  Nutrition, Rehabilitation, etc )   - Patient/family teaching  Outcome: Progressing     Problem: HEMATOLOGIC - ADULT  Goal: Maintains hematologic stability  Description: INTERVENTIONS  - Assess for signs and symptoms of bleeding or hemorrhage  - Monitor labs  - Administer supportive blood products/factors as ordered and appropriate  Outcome: Progressing     Problem: MUSCULOSKELETAL - ADULT  Goal: Maintain or return mobility to safest level of function  Description: INTERVENTIONS:  - Assess patient's ability to carry out ADLs; assess patient's baseline for ADL function and identify physical deficits which impact ability to perform ADLs (bathing, care of mouth/teeth, toileting, grooming, dressing, etc )  - Assess/evaluate cause of self-care deficits   - Assess range of motion  - Assess patient's mobility  - Assess patient's need for assistive devices and provide as appropriate  - Encourage maximum independence but intervene and supervise when necessary  - Involve family in performance of ADLs  - Assess for home care needs following discharge   - Consider OT consult to assist with ADL evaluation and planning for discharge  - Provide patient education as appropriate  Outcome: Progressing

## 2023-02-09 ENCOUNTER — TRANSCRIBE ORDERS (OUTPATIENT)
Dept: OBGYN CLINIC | Facility: CLINIC | Age: 63
End: 2023-02-09

## 2023-02-09 ENCOUNTER — TELEPHONE (OUTPATIENT)
Dept: OBGYN CLINIC | Facility: CLINIC | Age: 63
End: 2023-02-09

## 2023-02-09 DIAGNOSIS — Z12.31 ENCOUNTER FOR SCREENING MAMMOGRAM FOR MALIGNANT NEOPLASM OF BREAST: Primary | ICD-10-CM

## 2023-02-09 DIAGNOSIS — Z12.31 SCREENING MAMMOGRAM, ENCOUNTER FOR: Primary | ICD-10-CM

## 2023-02-15 ENCOUNTER — ANNUAL EXAM (OUTPATIENT)
Dept: OBGYN CLINIC | Facility: CLINIC | Age: 63
End: 2023-02-15

## 2023-02-15 VITALS
HEIGHT: 64 IN | WEIGHT: 159.4 LBS | DIASTOLIC BLOOD PRESSURE: 80 MMHG | BODY MASS INDEX: 27.21 KG/M2 | SYSTOLIC BLOOD PRESSURE: 138 MMHG

## 2023-02-15 DIAGNOSIS — Z01.419 WOMEN'S ANNUAL ROUTINE GYNECOLOGICAL EXAMINATION: Primary | ICD-10-CM

## 2023-02-15 NOTE — PROGRESS NOTES
Assessment/Plan:    Patient was informed of a stable menopausal GYN examination  She was reminded to see a dentist on a more regular basis  She feels safe at home  She had a colonoscopy  She will continue get yearly mammograms  She is content with her weight  She denies any problem with depression, patient anxiety which is under control  She should return to my office in 1 year unless new issues occur  Subjective:      Patient ID: Cristina Hamilton is a 63 y o  female  HPI      This is a 16-year-old white female, she is a  5 para 2 with 2 prior vaginal deliveries  She has been menopausal for many years  She is still sexually active  Her colonoscopy is up-to-date  She is made arranges for mammogram   She needs to see her dentist on a more regular basis she stopped because of COVID  She was positive for COVID last year and did well  Denies any major  or GI complaint  She feels safe at home  She has some anxiety but denies depression  Her recent colonoscopy showed polyps  She must go every 5 years  Family history significant for mother of a non-Hodgkin lymphoma she is now in remission  Her father will strain has a history of oxycodone addiction  The following portions of the patient's history were reviewed and updated as appropriate: allergies, current medications, past family history, past medical history, past social history, past surgical history and problem list     Review of Systems   All other systems reviewed and are negative  Objective:      /80   Ht 5' 4" (1 626 m)   Wt 72 3 kg (159 lb 6 4 oz)   BMI 27 36 kg/m²          Physical Exam  Vitals reviewed  Exam conducted with a chaperone present  Constitutional:       Appearance: Normal appearance  HENT:      Head: Normocephalic and atraumatic  Nose: Nose normal       Mouth/Throat:      Mouth: Mucous membranes are moist    Eyes:      Pupils: Pupils are equal, round, and reactive to light  Cardiovascular:      Rate and Rhythm: Normal rate and regular rhythm  Pulses: Normal pulses  Heart sounds: Normal heart sounds  Pulmonary:      Effort: Pulmonary effort is normal       Breath sounds: Normal breath sounds  Chest:   Breasts:     Breasts are symmetrical       Right: Normal  No swelling, bleeding, inverted nipple, mass, nipple discharge or skin change  Left: Normal  No swelling, bleeding, inverted nipple, mass, nipple discharge or skin change  Abdominal:      General: Abdomen is flat  Bowel sounds are normal  There is no distension  Palpations: Abdomen is soft  There is no hepatomegaly or splenomegaly  Tenderness: There is no abdominal tenderness  Hernia: There is no hernia in the left inguinal area or right inguinal area  Genitourinary:     General: Normal vulva  Pubic Area: No rash or pubic lice  Labia:         Right: No rash, tenderness, lesion or injury  Left: No rash, tenderness, lesion or injury  Urethra: No prolapse, urethral pain, urethral swelling or urethral lesion  Vagina: Normal  No signs of injury and foreign body  No vaginal discharge, erythema, tenderness, bleeding, lesions or prolapsed vaginal walls  Cervix: Normal       Uterus: Normal  Not deviated, not enlarged, not fixed, not tender and no uterine prolapse  Adnexa: Right adnexa normal and left adnexa normal         Right: No mass or tenderness  Left: No mass, tenderness or fullness  Rectum: Normal       Comments: The external genitalia within normal limits the vagina is clean  Cervix is parous and closed  Uterus is anterior normal size  The adnexa is clear bilaterally  Pap smear was performed  There is no evidence of prolapse  Urethra and bladder normal working relationship  Musculoskeletal:         General: Normal range of motion  Cervical back: Normal range of motion and neck supple     Lymphadenopathy:      Upper Body: Right upper body: No supraclavicular or axillary adenopathy  Left upper body: No supraclavicular or axillary adenopathy  Skin:     General: Skin is warm and dry  Neurological:      General: No focal deficit present  Mental Status: She is alert and oriented to person, place, and time     Psychiatric:         Mood and Affect: Mood normal          Behavior: Behavior normal

## 2023-02-15 NOTE — PATIENT INSTRUCTIONS
The patient was informed of a stable menopausal GYN examination  A Pap smear is performed  She was reminded see a dentist on a more regular basis  She is content with her weight  She feels safe at home  There is no problem with intimacy  She will continue to get yearly mammograms  She should return to my office in 1 year unless new issues occur

## 2023-02-17 LAB
CLINICAL INFO: NORMAL
CYTO CVX: NORMAL
CYTOLOGY CMNT CVX/VAG CYTO-IMP: NORMAL
DATE PREVIOUS BX: NORMAL
HPV E6+E7 MRNA CVX QL NAA+PROBE: NOT DETECTED
LMP START DATE: NORMAL
SL AMB PREV. PAP:: NORMAL
SPECIMEN SOURCE CVX/VAG CYTO: NORMAL

## 2023-05-12 ENCOUNTER — HOSPITAL ENCOUNTER (OUTPATIENT)
Dept: MAMMOGRAPHY | Facility: IMAGING CENTER | Age: 63
Discharge: HOME | End: 2023-05-12

## 2023-05-12 VITALS — BODY MASS INDEX: 26.98 KG/M2 | HEIGHT: 64 IN | WEIGHT: 158 LBS

## 2023-05-12 DIAGNOSIS — Z12.31 SCREENING MAMMOGRAM, ENCOUNTER FOR: ICD-10-CM

## 2023-08-26 NOTE — ASSESSMENT & PLAN NOTE
Likely anxiety related  · Does have mild sinus víctor on tele but no blocks  · troponins normal  · Encourage ambulation  · Lytes normal  · Getting Mg by night tem
Likely anxiety related  · Does have mild sinus víctor while sleeping/resting on tele but no blocks and HR responds appropriately with ambulatino   · troponins normal  · Encourage ambulation  · Lytes normal
RLE venous duplex negative   Elytes normal   Unknown etiology  WBAT
RLE venous duplex negative   · Elytes normal   · Unknown etiology  · WBAT
RLE venous duplex negative   · Elytes normal   · Unknown etiology  · WBAT
RLE venous duplex negative prelim   Elytes normal
RUQ abdominal pain   Lipase normal   Diet as tolerated
RUQ abdominal pain improving, has hx of GERD and hiatal hernia   Continue pepcid and prn tums  Lipase normal   Diet as tolerated
RUQ abdominal pain improving, has hx of GERD and hiatal hernia   · Continue pepcid and prn tums  · Lipase normal   · Diet as tolerated
RUQ abdominal pain improving, has hx of GERD and hiatal hernia   · Continue pepcid and prn tums  · Lipase normal   · Diet as tolerated
Seen in ER 1/7 w CP and palpitations x 1 week, found to have PACs on tele   Continues with pain and presented back to ER w/ multiple c/o     R/o ACS - check trop x 3, monitor on tele   EKG from 1/7 sinus tachy and Initial EKG today negative, per ER new inferior T wave inversions however I do not concur   ELZA = 0   D-dimer normal   Continue pepcid for GERD   Recommend outpatient exercise stress test +/- echocardiogram
Seen in ER 1/7 w CP and palpitations x 1 week, found to have PACs on tele   Continues with pain and presented back to ER w/ multiple c/o 1/9    Unlikely ACS - troponin negative x 3, EKG non ischemic with sinus arrhythmia   EKG from 1/7 sinus tach   ELZA = 0   D-dimer normal   Continue pepcid for GERD   Consider costochondritis vs viral pericarditis  Started ibuprofen q6 and add PRN toradol  Check echocardiogram   Continue to monitor on tele - no episodes thus far  Consider outpatient holter vs zio patch 
Seen in ER 1/7 w CP and palpitations x 1 week, found to have PACs on tele   · Presented back to ER 1/9 with multiple complaints   · Unlikely ACS - troponin negative x 3, EKG non ischemic with sinus arrhythmia   · ELZA = 0   · D-dimer normal   · Continue pepcid for GERD   · Consider costochondritis vs viral pericarditis  Started ibuprofen q6 and add PRN toradol  Denies CP today  · Check echocardiogram   · Continue to monitor on tele - no episodes thus far   Consider outpatient holter vs zio patch  · Patient aware if echo normal, will be discharged to f/u with PCP outpatient to consider outpatient stress/zio patch
Seen in ER 1/7 w CP and palpitations x 1 week, found to have PACs on tele   · Presented back to ER 1/9 with multiple complaints   · Unlikely ACS - troponin negative x 3, EKG non ischemic with sinus arrhythmia   · ELZA = 0   · D-dimer normal   · Continue pepcid for GERD   · Consider costochondritis? Started ibuprofen q6h with improvement   · Echo done however pending results at time of discharge   · Continue to monitor on tele - no episodes thus far   Consider outpatient holter vs zio patch as outpatient with primary care doctor
UA benign
UA benign
· UA benign
· UA benign
Yes

## 2023-09-09 ENCOUNTER — APPOINTMENT (OUTPATIENT)
Dept: RADIOLOGY | Facility: HOSPITAL | Age: 63
End: 2023-09-09
Payer: COMMERCIAL

## 2023-09-09 ENCOUNTER — HOSPITAL ENCOUNTER (EMERGENCY)
Facility: HOSPITAL | Age: 63
Discharge: HOME | End: 2023-09-09
Attending: EMERGENCY MEDICINE
Payer: COMMERCIAL

## 2023-09-09 VITALS
BODY MASS INDEX: 26.93 KG/M2 | OXYGEN SATURATION: 98 % | HEIGHT: 63 IN | HEART RATE: 84 BPM | WEIGHT: 152 LBS | DIASTOLIC BLOOD PRESSURE: 83 MMHG | TEMPERATURE: 97.6 F | RESPIRATION RATE: 20 BRPM | SYSTOLIC BLOOD PRESSURE: 182 MMHG

## 2023-09-09 DIAGNOSIS — T14.8XXA CONTUSION OF BONE: Primary | ICD-10-CM

## 2023-09-09 DIAGNOSIS — M79.671 RIGHT FOOT PAIN: ICD-10-CM

## 2023-09-09 PROCEDURE — 99283 EMERGENCY DEPT VISIT LOW MDM: CPT

## 2023-09-09 PROCEDURE — 73630 X-RAY EXAM OF FOOT: CPT

## 2023-09-09 PROCEDURE — 99284 EMERGENCY DEPT VISIT MOD MDM: CPT | Performed by: PHYSICIAN ASSISTANT

## 2023-09-09 NOTE — ED PROVIDER NOTES
History  Chief Complaint   Patient presents with   • Foot Pain     Pt states "I injured my right foot about 9 weeks ago. I thought it would get better but it has not. I think it may be a stress fracture" Pt reports stepping between a driveway and grass side on an angle. Denies falling. HPI     79-year-old female presents with injury to right foot that she states occurred in approximately mid July. She states she was stepping off of a driveway for the concrete and accidentally arched her foot onto the grass leaving the middle of her foot imaging on the driveway and half on the grass. She states she immediately had pain but tried to walk it off. States she has pain in the dorsum of her foot across the top of her distal metatarsals and has been ongoing worsening since that time. She is worried that she may have sustained a metatarsal stress fracture that area presents for evaluation for x-ray. Past medical history of GERD, MVP    Prior to Admission Medications   Prescriptions Last Dose Informant Patient Reported? Taking? cholecalciferol (VITAMIN D3) 1,000 units tablet  Self Yes No   Sig: Take 1,000 Units by mouth daily   famotidine (PEPCID) 20 mg tablet  Self Yes No   Sig: Take 20 mg by mouth 2 (two) times a day   Patient not taking: Reported on 2/15/2023   ibuprofen (MOTRIN) 600 mg tablet   No No   Sig: Take 1 tablet (600 mg total) by mouth every 8 (eight) hours for 5 days   Patient not taking: Reported on 2/15/2023      Facility-Administered Medications: None       Past Medical History:   Diagnosis Date   • BRCA1 negative    • BRCA2 negative    • GERD (gastroesophageal reflux disease)    • Mitral valve prolapse        History reviewed. No pertinent surgical history.     Family History   Problem Relation Age of Onset   • Lymphoma Mother 68   • Breast cancer Mother 46   • Diabetes Father    • Arthritis Father    • No Known Problems Sister    • No Known Problems Daughter    • No Known Problems Daughter    • Heart attack Maternal Grandmother    • Stroke Maternal Grandmother    • Breast cancer Maternal Grandfather         estimate 72   • Lung cancer Maternal Grandfather         Early 70's   • Heart disease Paternal Grandmother    • Alcohol abuse Paternal Grandfather    • Breast cancer Maternal Aunt         guessing early 42's   • No Known Problems Maternal Aunt    • No Known Problems Paternal Aunt    • Skin cancer Brother 61     I have reviewed and agree with the history as documented. E-Cigarette/Vaping   • E-Cigarette Use Never User      E-Cigarette/Vaping Substances   • Nicotine No    • THC No    • CBD No    • Flavoring No      Social History     Tobacco Use   • Smoking status: Former   • Smokeless tobacco: Never   • Tobacco comments:     quit 34 years ago   Vaping Use   • Vaping Use: Never used   Substance Use Topics   • Alcohol use: Yes     Comment: socially   • Drug use: Never       Review of Systems   Constitutional: Negative for chills and fever. HENT: Negative for ear pain and sore throat. Eyes: Negative for pain and visual disturbance. Respiratory: Negative for cough and shortness of breath. Cardiovascular: Negative for chest pain and palpitations. Gastrointestinal: Negative for abdominal pain and vomiting. Genitourinary: Negative for dysuria and hematuria. Musculoskeletal: Negative for arthralgias and back pain. Right dorsal metatarsal soft tissue tenderness. Patient locates pain also to plantar aspect of distal plantar third metatarsal   Skin: Negative for color change and rash. Neurological: Negative for seizures and syncope. All other systems reviewed and are negative. Physical Exam  Physical Exam  Vitals and nursing note reviewed. Constitutional:       General: She is not in acute distress. Appearance: She is well-developed. HENT:      Head: Normocephalic and atraumatic.    Eyes:      Conjunctiva/sclera: Conjunctivae normal.   Cardiovascular:      Rate and Rhythm: Normal rate and regular rhythm. Heart sounds: No murmur heard. Pulmonary:      Effort: Pulmonary effort is normal. No respiratory distress. Breath sounds: Normal breath sounds. Abdominal:      Palpations: Abdomen is soft. Tenderness: There is no abdominal tenderness. Musculoskeletal:         General: No swelling. Cervical back: Neck supple. Right foot: Normal range of motion and normal capillary refill. Tenderness present. No swelling, deformity, laceration or bony tenderness. Normal pulse. Left foot: Normal.      Comments: Patient has generalized soft tissue tenderness over dorsal aspects of second third and fourth metatarsal.  Normal sensation with good capillary refill. 2+ dorsalis pulses. Normal temperature and color of distal extremity. Skin:     General: Skin is warm and dry. Capillary Refill: Capillary refill takes less than 2 seconds. Neurological:      Mental Status: She is alert. Psychiatric:         Mood and Affect: Mood normal.         Vital Signs  ED Triage Vitals [09/09/23 1433]   Temperature Pulse Respirations Blood Pressure SpO2   97.6 °F (36.4 °C) 84 20 (!) 182/83 98 %      Temp Source Heart Rate Source Patient Position - Orthostatic VS BP Location FiO2 (%)   Temporal Monitor Sitting Left arm --      Pain Score       4           Vitals:    09/09/23 1433   BP: (!) 182/83   Pulse: 84   Patient Position - Orthostatic VS: Sitting         Visual Acuity      ED Medications  Medications - No data to display    Diagnostic Studies  Results Reviewed     None                 XR foot 3+ views RIGHT   ED Interpretation by Lanette Mix PA-C (09/09 3709)   No acute fracture or dislocation                 Procedures  Procedures         ED Course              Stable ED course without worsening condition or deterioration. Medical Decision Making  Differential myofascial strain versus bony contusion versus stress fracture. X-ray rules out stress fracture. Possible contusion of bone versus right foot strain. Referred out to podiatry in the event the patient has ongoing pain. If strain likely to have resolved within 4 to 6 weeks. Now with this ongoing for 9+ weeks consideration for also element of Pritchard's neuroma. Referred to podiatry for further follow-up if ongoing and possible need for MRI imaging. Patient thankful for evaluation. Offered Ortho shoe for limitation of plantarflexion. Patient lightly declined. Contusion of bone: self-limited or minor problem  Right foot pain: self-limited or minor problem  Amount and/or Complexity of Data Reviewed  Radiology: ordered and independent interpretation performed. Disposition  Final diagnoses:   Contusion of bone   Right foot pain     Time reflects when diagnosis was documented in both MDM as applicable and the Disposition within this note     Time User Action Codes Description Comment    9/9/2023  4:03 PM [id number] Contusion of bone     9/9/2023  4:05 PM Estrella Arango [G51.886] Right foot pain       ED Disposition     ED Disposition   Discharge    Condition   Stable    Date/Time   Sat Sep 9, 2023  4:03 PM    Comment   Katjairidipaulino Daugherty discharge to home/self care. Follow-up Information     Follow up With Specialties Details Why Contact Info    Martha Ramirez MD Internal Medicine Call today Call today for appointment.  [facility]            Discharge Medication List as of 9/9/2023  4:05 PM      CONTINUE these medications which have NOT CHANGED    Details   cholecalciferol (VITAMIN D3) 1,000 units tablet Take 1,000 Units by mouth daily, Historical Med      famotidine (PEPCID) 20 mg tablet Take 20 mg by mouth 2 (two) times a day, Historical Med      ibuprofen (MOTRIN) 600 mg tablet Take 1 tablet (600 mg total) by mouth every 8 (eight) hours for 5 days, Starting Mon 1/11/2021, Until Sat 1/16/2021, No Print                 PDMP Review     None          ED Provider  Electronically Signed by           Yoko Leach PA-C  09/09/23 8668

## 2023-09-09 NOTE — Clinical Note
Aleta Reddy was seen and treated in our emergency department on 9/9/2023. No restrictions            Diagnosis:     Citlali Rodriguez  . She may return on this date: If you have any questions or concerns, please don't hesitate to call.       Emeli Disla PA-C    ______________________________           _______________          _______________  Hospital Representative                              Date                                Time

## 2023-10-12 ENCOUNTER — HOSPITAL ENCOUNTER (EMERGENCY)
Facility: HOSPITAL | Age: 63
Discharge: HOME | End: 2023-10-12
Attending: EMERGENCY MEDICINE
Payer: COMMERCIAL

## 2023-10-12 VITALS
OXYGEN SATURATION: 98 % | HEIGHT: 63 IN | DIASTOLIC BLOOD PRESSURE: 66 MMHG | HEART RATE: 75 BPM | TEMPERATURE: 97.9 F | SYSTOLIC BLOOD PRESSURE: 129 MMHG | BODY MASS INDEX: 26.93 KG/M2 | WEIGHT: 152 LBS | RESPIRATION RATE: 14 BRPM

## 2023-10-12 DIAGNOSIS — T78.40XA ALLERGIC REACTION: Primary | ICD-10-CM

## 2023-10-12 LAB
ANION GAP SERPL CALCULATED.3IONS-SCNC: 6 MMOL/L
BASOPHILS # BLD AUTO: 0.08 THOUSANDS/ÂΜL (ref 0–0.1)
BASOPHILS NFR BLD AUTO: 1 % (ref 0–1)
BUN SERPL-MCNC: 15 MG/DL (ref 5–25)
CALCIUM SERPL-MCNC: 9.9 MG/DL (ref 8.4–10.2)
CHLORIDE SERPL-SCNC: 104 MMOL/L (ref 96–108)
CO2 SERPL-SCNC: 27 MMOL/L (ref 21–32)
CREAT SERPL-MCNC: 0.76 MG/DL (ref 0.6–1.3)
EOSINOPHIL # BLD AUTO: 0.01 THOUSAND/ÂΜL (ref 0–0.61)
EOSINOPHIL NFR BLD AUTO: 0 % (ref 0–6)
ERYTHROCYTE [DISTWIDTH] IN BLOOD BY AUTOMATED COUNT: 11.7 % (ref 11.6–15.1)
GFR SERPL CREATININE-BSD FRML MDRD: 85 ML/MIN/1.73SQ M
GLUCOSE SERPL-MCNC: 138 MG/DL (ref 65–140)
HCT VFR BLD AUTO: 41.5 % (ref 34.8–46.1)
HGB BLD-MCNC: 13.9 G/DL (ref 11.5–15.4)
IMM GRANULOCYTES # BLD AUTO: 0.04 THOUSAND/UL (ref 0–0.2)
IMM GRANULOCYTES NFR BLD AUTO: 0 % (ref 0–2)
LYMPHOCYTES # BLD AUTO: 2.33 THOUSANDS/ÂΜL (ref 0.6–4.47)
LYMPHOCYTES NFR BLD AUTO: 16 % (ref 14–44)
MCH RBC QN AUTO: 33.3 PG (ref 26.8–34.3)
MCHC RBC AUTO-ENTMCNC: 33.5 G/DL (ref 31.4–37.4)
MCV RBC AUTO: 100 FL (ref 82–98)
MONOCYTES # BLD AUTO: 1.28 THOUSAND/ÂΜL (ref 0.17–1.22)
MONOCYTES NFR BLD AUTO: 9 % (ref 4–12)
NEUTROPHILS # BLD AUTO: 10.91 THOUSANDS/ÂΜL (ref 1.85–7.62)
NEUTS SEG NFR BLD AUTO: 74 % (ref 43–75)
NRBC BLD AUTO-RTO: 0 /100 WBCS
PLATELET # BLD AUTO: 461 THOUSANDS/UL (ref 149–390)
PMV BLD AUTO: 9.1 FL (ref 8.9–12.7)
POTASSIUM SERPL-SCNC: 4.3 MMOL/L (ref 3.5–5.3)
RBC # BLD AUTO: 4.17 MILLION/UL (ref 3.81–5.12)
SODIUM SERPL-SCNC: 137 MMOL/L (ref 135–147)
WBC # BLD AUTO: 14.65 THOUSAND/UL (ref 4.31–10.16)

## 2023-10-12 PROCEDURE — 99291 CRITICAL CARE FIRST HOUR: CPT | Performed by: EMERGENCY MEDICINE

## 2023-10-12 PROCEDURE — 80048 BASIC METABOLIC PNL TOTAL CA: CPT | Performed by: EMERGENCY MEDICINE

## 2023-10-12 PROCEDURE — 85025 COMPLETE CBC W/AUTO DIFF WBC: CPT | Performed by: EMERGENCY MEDICINE

## 2023-10-12 PROCEDURE — 93005 ELECTROCARDIOGRAM TRACING: CPT

## 2023-10-12 PROCEDURE — 96372 THER/PROPH/DIAG INJ SC/IM: CPT

## 2023-10-12 PROCEDURE — 96375 TX/PRO/DX INJ NEW DRUG ADDON: CPT

## 2023-10-12 PROCEDURE — 99283 EMERGENCY DEPT VISIT LOW MDM: CPT

## 2023-10-12 PROCEDURE — 96361 HYDRATE IV INFUSION ADD-ON: CPT

## 2023-10-12 PROCEDURE — 96374 THER/PROPH/DIAG INJ IV PUSH: CPT

## 2023-10-12 PROCEDURE — 36415 COLL VENOUS BLD VENIPUNCTURE: CPT | Performed by: EMERGENCY MEDICINE

## 2023-10-12 RX ORDER — EPINEPHRINE 1 MG/ML
0.3 INJECTION, SOLUTION, CONCENTRATE INTRAVENOUS ONCE
Status: COMPLETED | OUTPATIENT
Start: 2023-10-12 | End: 2023-10-12

## 2023-10-12 RX ORDER — DEXAMETHASONE SODIUM PHOSPHATE 10 MG/ML
10 INJECTION, SOLUTION INTRAMUSCULAR; INTRAVENOUS ONCE
Status: COMPLETED | OUTPATIENT
Start: 2023-10-12 | End: 2023-10-12

## 2023-10-12 RX ORDER — FAMOTIDINE 10 MG/ML
20 INJECTION, SOLUTION INTRAVENOUS ONCE
Status: COMPLETED | OUTPATIENT
Start: 2023-10-12 | End: 2023-10-12

## 2023-10-12 RX ORDER — EPINEPHRINE 0.3 MG/.3ML
0.3 INJECTION SUBCUTANEOUS ONCE
Qty: 0.6 ML | Refills: 0 | Status: SHIPPED | OUTPATIENT
Start: 2023-10-12 | End: 2023-10-12

## 2023-10-12 RX ADMIN — DEXAMETHASONE SODIUM PHOSPHATE 10 MG: 10 INJECTION, SOLUTION INTRAMUSCULAR; INTRAVENOUS at 13:52

## 2023-10-12 RX ADMIN — EPINEPHRINE 0.3 MG: 1 INJECTION, SOLUTION, CONCENTRATE INTRAVENOUS at 13:54

## 2023-10-12 RX ADMIN — FAMOTIDINE 20 MG: 10 INJECTION INTRAVENOUS at 13:52

## 2023-10-12 RX ADMIN — SODIUM CHLORIDE 1000 ML: 0.9 INJECTION, SOLUTION INTRAVENOUS at 13:48

## 2023-10-12 NOTE — ED PROVIDER NOTES
History  Chief Complaint   Patient presents with    Allergic Reaction     Patient presents to the ED with c/o lip numbness and potential allergic reaction to methylprednisone, states she took 20mL liquid benadryl approx 40 minutes ago      This is a 71-year-old female who states that she just started Medrol Dosepak for back pain presents today for evaluation of tingling in her lips and tongue in addition to pruritus and erythema to the upper chest and back area concern for an allergic reaction. Patient did take 50 mg of Benadryl at home just prior to arrival.  Denies any nausea vomiting no stridor. History provided by:  Patient  Medical Problem  Location:  Generalized  Quality:  Allergic reaction  Severity:  Moderate  Onset quality:  Gradual  Duration:  2 hours  Timing:  Constant  Progression:  Worsening  Chronicity:  New  Context: Allergic reaction  Relieved by:  Somewhat by Benadryl prior to arrival  Associated symptoms: rash        Prior to Admission Medications   Prescriptions Last Dose Informant Patient Reported? Taking? 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 No pertinent surgical history.     Family History   Problem Relation Age of Onset    Lymphoma Mother 68    Breast cancer Mother 46    Diabetes Father     Arthritis Father     No Known Problems Sister     No Known Problems Daughter     No Known Problems Daughter     Heart attack Maternal Grandmother     Stroke Maternal Grandmother     Breast cancer Maternal Grandfather         estimate 67    Lung cancer Maternal Grandfather         Early 70's    Heart disease Paternal Grandmother     Alcohol abuse Paternal Grandfather     Breast cancer Maternal Aunt         guessing early 42's    No Known Problems Maternal Aunt     No Known Problems Paternal Aunt     Skin cancer Brother 61     I have reviewed and agree with the history as documented. E-Cigarette/Vaping    E-Cigarette Use Never User      E-Cigarette/Vaping Substances    Nicotine No     THC No     CBD No     Flavoring No      Social History     Tobacco Use    Smoking status: Former    Smokeless tobacco: Never    Tobacco comments:     quit 34 years ago   Vaping Use    Vaping Use: Never used   Substance Use Topics    Alcohol use: Yes     Comment: socially    Drug use: Never       Review of Systems   Skin:  Positive for rash. All other systems reviewed and are negative. Physical Exam  Physical Exam  Vitals and nursing note reviewed. Constitutional:       General: She is not in acute distress. Appearance: She is not ill-appearing, toxic-appearing or diaphoretic. HENT:      Head: Normocephalic and atraumatic. Right Ear: Tympanic membrane, ear canal and external ear normal.      Left Ear: Tympanic membrane, ear canal and external ear normal.   Eyes:      General:         Right eye: No discharge. Left eye: No discharge. Extraocular Movements: Extraocular movements intact. Pupils: Pupils are equal, round, and reactive to light. Cardiovascular:      Rate and Rhythm: Normal rate and regular rhythm. Pulses: Normal pulses. Heart sounds: No murmur heard. No friction rub. No gallop. Pulmonary:      Effort: Pulmonary effort is normal. No respiratory distress. Breath sounds: No stridor. No wheezing, rhonchi or rales. Abdominal:      General: There is no distension.       Palpations: Abdomen is soft. Tenderness: There is no abdominal tenderness. There is no guarding or rebound. Musculoskeletal:         General: No swelling, tenderness, deformity or signs of injury. Normal range of motion. Cervical back: Normal range of motion and neck supple. No rigidity or tenderness. Right lower leg: No edema. Left lower leg: No edema. Skin:     Coloration: Skin is not jaundiced. Findings: Erythema and rash present. No bruising. Comments: Erythema to anterior neck area and upper back   Neurological:      General: No focal deficit present. Mental Status: She is alert and oriented to person, place, and time. Cranial Nerves: No cranial nerve deficit. Sensory: No sensory deficit. Motor: No weakness. Coordination: Coordination normal.   Psychiatric:         Mood and Affect: Mood normal.         Behavior: Behavior normal.         Thought Content:  Thought content normal.         Vital Signs  ED Triage Vitals [10/12/23 1303]   Temperature Pulse Respirations Blood Pressure SpO2   97.9 °F (36.6 °C) 95 18 (!) 186/82 98 %      Temp Source Heart Rate Source Patient Position - Orthostatic VS BP Location FiO2 (%)   Temporal Monitor Sitting Left arm --      Pain Score       No Pain           Vitals:    10/12/23 1303 10/12/23 1316 10/12/23 1400   BP: (!) 186/82 170/76 (!) 176/75   Pulse: 95 90 74   Patient Position - Orthostatic VS: Sitting  Sitting         Visual Acuity      ED Medications  Medications   sodium chloride 0.9 % bolus 1,000 mL (1,000 mL Intravenous New Bag 10/12/23 1348)   EPINEPHrine PF (ADRENALIN) 1 mg/mL injection 0.3 mg (0.3 mg Intramuscular Given 10/12/23 1354)   dexamethasone (PF) (DECADRON) injection 10 mg (10 mg Intravenous Given 10/12/23 1352)   Famotidine (PF) (PEPCID) injection 20 mg (20 mg Intravenous Given 10/12/23 1352)       Diagnostic Studies  Results Reviewed       Procedure Component Value Units Date/Time    Basic metabolic panel [[id number]] Collected: 10/12/23 1358    Lab Status: Final result Specimen: Blood from Arm, Right Updated: 10/12/23 1422     Sodium 137 mmol/L      Potassium 4.3 mmol/L      Chloride 104 mmol/L      CO2 27 mmol/L      ANION GAP 6 mmol/L      BUN 15 mg/dL      Creatinine 0.76 mg/dL      Glucose 138 mg/dL      Calcium 9.9 mg/dL      eGFR 85 ml/min/1.73sq m     Narrative:      [place] guidelines for Chronic Kidney Disease (CKD):     Stage 1 with normal or high GFR (GFR > 90 mL/min/1.73 square meters)    Stage 2 Mild CKD (GFR = 60-89 mL/min/1.73 square meters)    Stage 3A Moderate CKD (GFR = 45-59 mL/min/1.73 square meters)    Stage 3B Moderate CKD (GFR = 30-44 mL/min/1.73 square meters)    Stage 4 Severe CKD (GFR = 15-29 mL/min/1.73 square meters)    Stage 5 End Stage CKD (GFR <15 mL/min/1.73 square meters)  Note: GFR calculation is accurate only with a steady state creatinine    CBC and differential [[id number]]  (Abnormal) Collected: 10/12/23 1358    Lab Status: Final result Specimen: Blood from Arm, Right Updated: 10/12/23 1408     WBC 14.65 Thousand/uL      RBC 4.17 Million/uL      Hemoglobin 13.9 g/dL      Hematocrit 41.5 %       fL      MCH 33.3 pg      MCHC 33.5 g/dL      RDW 11.7 %      MPV 9.1 fL      Platelets 851 Thousands/uL      nRBC 0 /100 WBCs      Neutrophils Relative 74 %      Immat GRANS % 0 %      Lymphocytes Relative 16 %      Monocytes Relative 9 %      Eosinophils Relative 0 %      Basophils Relative 1 %      Neutrophils Absolute 10.91 Thousands/µL      Immature Grans Absolute 0.04 Thousand/uL      Lymphocytes Absolute 2.33 Thousands/µL      Monocytes Absolute 1.28 Thousand/µL      Eosinophils Absolute 0.01 Thousand/µL      Basophils Absolute 0.08 Thousands/µL                    No orders to display              Procedures  CriticalCare Time    Date/Time: 10/12/2023 2:47 PM    Performed by: Edenilson Rizo DO  Authorized by: Edenilson Rizo DO    Critical care provider statement:     Critical care time (minutes):  30    Critical care time was exclusive of:  Separately billable procedures and treating other patients    Critical care was necessary to treat or prevent imminent or life-threatening deterioration of the following conditions: Acute allergic reaction. Critical care was time spent personally by me on the following activities:  Obtaining history from patient or surrogate, development of treatment plan with patient or surrogate, evaluation of patient's response to treatment, examination of patient, interpretation of cardiac output measurements, ordering and review of laboratory studies and re-evaluation of patient's condition    I assumed direction of critical care for this patient from another provider in my specialty: no    ECG 12 Lead Documentation Only    Date/Time: 10/12/2023 2:49 PM    Performed by: Yareli Bermudez DO  Authorized by: Yareli Bermudez DO    ECG reviewed by me, the ED Provider: yes    Patient location:  ED  Rate:     ECG rate:  93  Rhythm:     Rhythm: sinus rhythm    Conduction:     Conduction: normal    T waves:     T waves: normal             ED Course  ED Course as of 10/12/23 1618   Thu Oct 12, 2023   1438 Leukocytosis felt secondary to steroids   1439 Patient feeling better except for jitteriness rash is resolving no nausea vomiting trouble breathing or stridor. We will continue to monitor   1559 Patient remains stable and improved okay for discharge and outpatient follow-up return if symptoms worsen                               SBIRT 20yo+      Flowsheet Row Most Recent Value   Initial Alcohol Screen: US AUDIT-C     1. How often do you have a drink containing alcohol? 0 Filed at: 10/12/2023 1304   2. How many drinks containing alcohol do you have on a typical day you are drinking? 0 Filed at: 10/12/2023 1304   3a. Male UNDER 65: How often do you have five or more drinks on one occasion? 0 Filed at: 10/12/2023 1304   3b.  FEMALE Any Age, or MALE 65+: How often do you have 4 or more drinks on one occassion? 0 Filed at: 10/12/2023 1304   Audit-C Score 0 Filed at: 10/12/2023 1304   VJ: How many times in the past year have you. .. Used an illegal drug or used a prescription medication for non-medical reasons? Never Filed at: 10/12/2023 1304                      Medical Decision Making  Allergic reaction we will treat with additional Benadryl IV fluids epinephrine and Decadron continue to observe and continue to observe    Amount and/or Complexity of Data Reviewed  Labs: ordered. Risk  OTC drugs. Prescription drug management. Disposition  Final diagnoses: Allergic reaction     Time reflects when diagnosis was documented in both MDM as applicable and the Disposition within this note       Time User Action Codes Description Comment    10/12/2023  1:52 PM Jocelyn Mccarty Add [T78.40XA] Allergic reaction           ED Disposition       ED Disposition   Discharge    Condition   Stable    Date/Time   Thu Oct 12, 2023 [address] discharge to home/self care.                    Follow-up Information       Follow up With Specialties Details Why Contact Info Additional Information    Morgan Darden MD Internal Medicine   [address] [facility],[address]  [address] [facility] Emergency Department Emergency Medicine  As needed, If symptoms worsen [address] [facility] [phone]  [address] [facility] Emergency Department, [address] [facility], [address] [place] [address],[address]            Patient's Medications   Discharge Prescriptions    DIPHENHYDRAMINE (BENADRYL) 50 MG TABLET    Take 1 tablet (50 mg total) by mouth every 6 (six) hours as needed for itching or allergies (rash)       Start Date: 10/12/2023End Date: --       Order Dose: 50 mg       Quantity: 30 tablet    Refills: 0    EPINEPHRINE (EPIPEN) 0.3 MG/0.3 ML SOAJ    Inject 0.3 mL (0.3 mg total) into a muscle once for 1 dose       Start Date: 10/12/2023End Date: 10/12/2023       Order Dose: 0.3 mg       Quantity: 0.6 mL    Refills: 0       No discharge procedures on file.     PDMP Review       None            ED Provider  Electronically Signed by             Florentino Whitaker DO  10/12/23 8654

## 2023-10-13 LAB
ATRIAL RATE: 78 BPM
P AXIS: 67 DEGREES
PR INTERVAL: 132 MS
QRS AXIS: 83 DEGREES
QRSD INTERVAL: 84 MS
QT INTERVAL: 386 MS
QTC INTERVAL: 440 MS
T WAVE AXIS: 67 DEGREES
VENTRICULAR RATE: 78 BPM

## 2023-10-13 PROCEDURE — 93010 ELECTROCARDIOGRAM REPORT: CPT | Performed by: INTERNAL MEDICINE

## 2023-10-30 ENCOUNTER — HOSPITAL ENCOUNTER (EMERGENCY)
Facility: HOSPITAL | Age: 63
Discharge: HOME | End: 2023-10-30
Attending: EMERGENCY MEDICINE | Admitting: EMERGENCY MEDICINE
Payer: COMMERCIAL

## 2023-10-30 VITALS
DIASTOLIC BLOOD PRESSURE: 77 MMHG | SYSTOLIC BLOOD PRESSURE: 136 MMHG | RESPIRATION RATE: 21 BRPM | OXYGEN SATURATION: 98 % | TEMPERATURE: 97.9 F | HEART RATE: 77 BPM

## 2023-10-30 DIAGNOSIS — R53.83 FATIGUE: ICD-10-CM

## 2023-10-30 DIAGNOSIS — R07.89 ATYPICAL CHEST PAIN: Primary | ICD-10-CM

## 2023-10-30 DIAGNOSIS — D75.839 THROMBOCYTOSIS: ICD-10-CM

## 2023-10-30 LAB
ALBUMIN SERPL BCP-MCNC: 4.5 G/DL (ref 3.5–5)
ALP SERPL-CCNC: 63 U/L (ref 34–104)
ALT SERPL W P-5'-P-CCNC: 19 U/L (ref 7–52)
ANION GAP SERPL CALCULATED.3IONS-SCNC: 9 MMOL/L
AST SERPL W P-5'-P-CCNC: 22 U/L (ref 13–39)
ATRIAL RATE: 83 BPM
BASOPHILS # BLD AUTO: 0.09 THOUSANDS/ÂΜL (ref 0–0.1)
BASOPHILS NFR BLD AUTO: 1 % (ref 0–1)
BILIRUB SERPL-MCNC: 0.49 MG/DL (ref 0.2–1)
BILIRUB UR QL STRIP: NEGATIVE
BUN SERPL-MCNC: 11 MG/DL (ref 5–25)
CALCIUM SERPL-MCNC: 10.1 MG/DL (ref 8.4–10.2)
CHLORIDE SERPL-SCNC: 106 MMOL/L (ref 96–108)
CLARITY UR: CLEAR
CO2 SERPL-SCNC: 25 MMOL/L (ref 21–32)
COLOR UR: NORMAL
CREAT SERPL-MCNC: 0.76 MG/DL (ref 0.6–1.3)
EOSINOPHIL # BLD AUTO: 0.09 THOUSAND/ÂΜL (ref 0–0.61)
EOSINOPHIL NFR BLD AUTO: 1 % (ref 0–6)
ERYTHROCYTE [DISTWIDTH] IN BLOOD BY AUTOMATED COUNT: 11.8 % (ref 11.6–15.1)
FLUAV RNA RESP QL NAA+PROBE: NEGATIVE
FLUBV RNA RESP QL NAA+PROBE: NEGATIVE
GFR SERPL CREATININE-BSD FRML MDRD: 85 ML/MIN/1.73SQ M
GLUCOSE SERPL-MCNC: 110 MG/DL (ref 65–140)
GLUCOSE UR STRIP-MCNC: NEGATIVE MG/DL
HCT VFR BLD AUTO: 42.4 % (ref 34.8–46.1)
HGB BLD-MCNC: 14.5 G/DL (ref 11.5–15.4)
HGB UR QL STRIP.AUTO: NEGATIVE
IMM GRANULOCYTES # BLD AUTO: 0.02 THOUSAND/UL (ref 0–0.2)
IMM GRANULOCYTES NFR BLD AUTO: 0 % (ref 0–2)
KETONES UR STRIP-MCNC: NEGATIVE MG/DL
LEUKOCYTE ESTERASE UR QL STRIP: NEGATIVE
LYMPHOCYTES # BLD AUTO: 2.98 THOUSANDS/ÂΜL (ref 0.6–4.47)
LYMPHOCYTES NFR BLD AUTO: 39 % (ref 14–44)
MCH RBC QN AUTO: 33 PG (ref 26.8–34.3)
MCHC RBC AUTO-ENTMCNC: 34.2 G/DL (ref 31.4–37.4)
MCV RBC AUTO: 96 FL (ref 82–98)
MONOCYTES # BLD AUTO: 1 THOUSAND/ÂΜL (ref 0.17–1.22)
MONOCYTES NFR BLD AUTO: 13 % (ref 4–12)
NEUTROPHILS # BLD AUTO: 3.52 THOUSANDS/ÂΜL (ref 1.85–7.62)
NEUTS SEG NFR BLD AUTO: 46 % (ref 43–75)
NITRITE UR QL STRIP: NEGATIVE
NRBC BLD AUTO-RTO: 0 /100 WBCS
P AXIS: 77 DEGREES
PH UR STRIP.AUTO: 7 [PH]
PLATELET # BLD AUTO: 488 THOUSANDS/UL (ref 149–390)
PMV BLD AUTO: 9.3 FL (ref 8.9–12.7)
POTASSIUM SERPL-SCNC: 3.7 MMOL/L (ref 3.5–5.3)
PR INTERVAL: 128 MS
PROT SERPL-MCNC: 7.7 G/DL (ref 6.4–8.4)
PROT UR STRIP-MCNC: NEGATIVE MG/DL
QRS AXIS: 84 DEGREES
QRSD INTERVAL: 78 MS
QT INTERVAL: 370 MS
QTC INTERVAL: 434 MS
RBC # BLD AUTO: 4.4 MILLION/UL (ref 3.81–5.12)
RSV RNA RESP QL NAA+PROBE: NEGATIVE
SARS-COV-2 RNA RESP QL NAA+PROBE: NEGATIVE
SODIUM SERPL-SCNC: 140 MMOL/L (ref 135–147)
SP GR UR STRIP.AUTO: 1.01 (ref 1–1.03)
T WAVE AXIS: 82 DEGREES
UROBILINOGEN UR STRIP-ACNC: <2 MG/DL
VENTRICULAR RATE: 83 BPM
WBC # BLD AUTO: 7.7 THOUSAND/UL (ref 4.31–10.16)

## 2023-10-30 PROCEDURE — 99285 EMERGENCY DEPT VISIT HI MDM: CPT | Performed by: EMERGENCY MEDICINE

## 2023-10-30 PROCEDURE — 81003 URINALYSIS AUTO W/O SCOPE: CPT | Performed by: EMERGENCY MEDICINE

## 2023-10-30 PROCEDURE — 85025 COMPLETE CBC W/AUTO DIFF WBC: CPT | Performed by: EMERGENCY MEDICINE

## 2023-10-30 PROCEDURE — 0241U HB NFCT DS VIR RESP RNA 4 TRGT: CPT | Performed by: EMERGENCY MEDICINE

## 2023-10-30 PROCEDURE — 93010 ELECTROCARDIOGRAM REPORT: CPT | Performed by: INTERNAL MEDICINE

## 2023-10-30 PROCEDURE — 99284 EMERGENCY DEPT VISIT MOD MDM: CPT

## 2023-10-30 PROCEDURE — 80053 COMPREHEN METABOLIC PANEL: CPT | Performed by: EMERGENCY MEDICINE

## 2023-10-30 PROCEDURE — 93005 ELECTROCARDIOGRAM TRACING: CPT

## 2023-10-30 PROCEDURE — 36415 COLL VENOUS BLD VENIPUNCTURE: CPT | Performed by: EMERGENCY MEDICINE

## 2023-10-30 NOTE — ED PROVIDER NOTES
History  Chief Complaint   Patient presents with    Chest Pain     Chest burning since yesterday. Generalized weakness. 19-year-old female with history of mitral valve prolapse, chronic thrombocytosis and GERD complains of 3 weeks of generalized weakness and fatigue with occasional lightheadedness and tinnitus. Has an appointment with oncologist for thrombocytosis and these symptoms. She has had brief episodes of retrosternal chest tightness yesterday and today. No exacerbating or alleviating factors, no dyspnea, palpitations, nausea or syncope. Has no known coronary artery abnormalities. Has had some episodes of mild diarrhea over the past 3 weeks as well but no blood in the stool. Prior to Admission Medications   Prescriptions Last Dose Informant Patient Reported? Taking? EPINEPHrine (EPIPEN) 0.3 mg/0.3 mL SOAJ   No No   Sig: Inject 0.3 mL (0.3 mg total) into a muscle once for 1 dose   cholecalciferol (VITAMIN D3) 1,000 units tablet  Self Yes No   Sig: Take 1,000 Units by mouth daily   diphenhydrAMINE (BENADRYL) 50 MG tablet   No No   Sig: Take 1 tablet (50 mg total) by mouth every 6 (six) hours as needed for itching or allergies (rash)   famotidine (PEPCID) 20 mg tablet  Self Yes No   Sig: Take 20 mg by mouth 2 (two) times a day   Patient not taking: Reported on 2/15/2023   ibuprofen (MOTRIN) 600 mg tablet   No No   Sig: Take 1 tablet (600 mg total) by mouth every 8 (eight) hours for 5 days   Patient not taking: Reported on 2/15/2023      Facility-Administered Medications: None       Past Medical History:   Diagnosis Date    BRCA1 negative     BRCA2 negative     GERD (gastroesophageal reflux disease)     Mitral valve prolapse        History reviewed. No pertinent surgical history.     Family History   Problem Relation Age of Onset    Lymphoma Mother 68    Breast cancer Mother 46    Diabetes Father     Arthritis Father     No Known Problems Sister     No Known Problems Daughter     No Known Problems Daughter     Heart attack Maternal Grandmother     Stroke Maternal Grandmother     Breast cancer Maternal Grandfather         estimate 67    Lung cancer Maternal Grandfather         Early 70's    Heart disease Paternal Grandmother     Alcohol abuse Paternal Grandfather     Breast cancer Maternal Aunt         guessing early 42's    No Known Problems Maternal Aunt     No Known Problems Paternal Aunt     Skin cancer Brother 61     I have reviewed and agree with the history as documented. E-Cigarette/Vaping    E-Cigarette Use Never User      E-Cigarette/Vaping Substances    Nicotine No     THC No     CBD No     Flavoring No      Social History     Tobacco Use    Smoking status: Former    Smokeless tobacco: Never    Tobacco comments:     quit 34 years ago   Vaping Use    Vaping Use: Never used   Substance Use Topics    Alcohol use: Yes     Comment: socially    Drug use: Never       Review of Systems   Constitutional:  Positive for fatigue. Negative for activity change, appetite change, diaphoresis, fever and unexpected weight change. HENT:  Negative for congestion and sore throat. Eyes:  Negative for photophobia and visual disturbance. Respiratory:  Positive for chest tightness. Negative for cough and shortness of breath. Cardiovascular:  Negative for chest pain, palpitations and leg swelling. Gastrointestinal:  Positive for diarrhea. Negative for abdominal pain, blood in stool, nausea and vomiting. Genitourinary:  Negative for decreased urine volume, dysuria, flank pain and hematuria. Musculoskeletal:  Negative for myalgias. Skin:  Negative for rash. Neurological:  Positive for light-headedness. Negative for syncope, speech difficulty, numbness and headaches. Physical Exam  Physical Exam  Vitals and nursing note reviewed. Constitutional:       General: She is not in acute distress. Appearance: She is well-developed and normal weight. She is not ill-appearing or diaphoretic. HENT:      Head: Normocephalic and atraumatic. Eyes:      Conjunctiva/sclera: Conjunctivae normal.      Pupils: Pupils are equal, round, and reactive to light. Neck:      Vascular: No JVD. Cardiovascular:      Rate and Rhythm: Normal rate and regular rhythm. Pulses:           Radial pulses are 3+ on the right side and 3+ on the left side. Posterior tibial pulses are 2+ on the right side and 2+ on the left side. Heart sounds: Normal heart sounds. No murmur heard. Pulmonary:      Effort: Pulmonary effort is normal.      Breath sounds: Normal breath sounds. Abdominal:      General: Bowel sounds are normal.      Palpations: Abdomen is soft. There is no fluid wave or mass. Tenderness: There is no abdominal tenderness. Musculoskeletal:         General: Normal range of motion. Cervical back: Normal range of motion and neck supple. Right lower leg: No tenderness. No edema. Left lower leg: No tenderness. No edema. Skin:     General: Skin is warm and dry. Capillary Refill: Capillary refill takes less than 2 seconds. Findings: No rash. Neurological:      General: No focal deficit present. Mental Status: She is alert and oriented to person, place, and time. Cranial Nerves: No cranial nerve deficit. Motor: No weakness. Coordination: Coordination normal.      Deep Tendon Reflexes: Reflexes are normal and symmetric.    Psychiatric:         Mood and Affect: Mood normal.         Behavior: Behavior normal.         Vital Signs  ED Triage Vitals [10/30/23 1111]   Temperature Pulse Respirations Blood Pressure SpO2   97.9 °F (36.6 °C) 94 18 144/79 100 %      Temp Source Heart Rate Source Patient Position - Orthostatic VS BP Location FiO2 (%)   Temporal -- Lying Right arm --      Pain Score       4           Vitals:    10/30/23 1111 10/30/23 1200 10/30/23 1300   BP: 144/79 134/75 136/77   Pulse: 94 59 77   Patient Position - Orthostatic VS: Lying Visual Acuity      ED Medications  Medications - No data to display    Diagnostic Studies  Results Reviewed       Procedure Component Value Units Date/Time    UA (URINE) with reflex to Scope [[id number]] Collected: 10/30/23 1225    Lab Status: Final result Specimen: Urine, Clean Catch Updated: 10/30/23 1301     Color, UA Light Yellow     Clarity, UA Clear     Specific Gravity, UA 1.010     pH, UA 7.0     Leukocytes, UA Negative     Nitrite, UA Negative     Protein, UA Negative mg/dl      Glucose, UA Negative mg/dl      Ketones, UA Negative mg/dl      Urobilinogen, UA <2.0 mg/dl      Bilirubin, UA Negative     Occult Blood, UA Negative    FLU/RSV/COVID - if FLU/RSV clinically relevant [[id number]]  (Normal) Collected: 10/30/23 1201    Lab Status: Final result Specimen: Nares from Nose Updated: 10/30/23 1257     SARS-CoV-2 Negative     INFLUENZA A PCR Negative     INFLUENZA B PCR Negative     RSV PCR Negative    Narrative:      FOR PEDIATRIC PATIENTS - copy/paste COVID Guidelines URL to browser: [email or web address]    SARS-CoV-2 assay is a Nucleic Acid Amplification assay intended for the  qualitative detection of nucleic acid from SARS-CoV-2 in nasopharyngeal  swabs. Results are for the presumptive identification of SARS-CoV-2 RNA. Positive results are indicative of infection with SARS-CoV-2, the virus  causing COVID-19, but do not rule out bacterial infection or co-infection  with other viruses. Laboratories within the [facility] and its  territories are required to report all positive results to the appropriate  public health authorities. Negative results do not preclude SARS-CoV-2  infection and should not be used as the sole basis for treatment or other  patient management decisions. Negative results must be combined with  clinical observations, patient history, and epidemiological information.   This test has not been FDA cleared or approved. This test has been authorized by FDA under an Emergency Use Authorization  (EUA). This test is only authorized for the duration of time the  declaration that circumstances exist justifying the authorization of the  emergency use of an in vitro diagnostic tests for detection of SARS-CoV-2  virus and/or diagnosis of COVID-19 infection under section 564(b)(1) of  the Act, 21 U. S.C. 977NTU-2(B)(7), unless the authorization is terminated  or revoked sooner. The test has been validated but independent review by FDA  and CLIA is pending. Test performed using [employer]: This RT-PCR assay targets N2,  a region unique to SARS-CoV-2. A conserved region in the E-gene was chosen  for pan-Sarbecovirus detection which includes SARS-CoV-2. According to CMS-2020-01-R, this platform meets the definition of high-throughput technology.     Comprehensive metabolic panel [[id number]] Collected: 10/30/23 1201    Lab Status: Final result Specimen: Blood from Arm, Left Updated: 10/30/23 1228     Sodium 140 mmol/L      Potassium 3.7 mmol/L      Chloride 106 mmol/L      CO2 25 mmol/L      ANION GAP 9 mmol/L      BUN 11 mg/dL      Creatinine 0.76 mg/dL      Glucose 110 mg/dL      Calcium 10.1 mg/dL      AST 22 U/L      ALT 19 U/L      Alkaline Phosphatase 63 U/L      Total Protein 7.7 g/dL      Albumin 4.5 g/dL      Total Bilirubin 0.49 mg/dL      eGFR 85 ml/min/1.73sq m     Narrative:      [place] guidelines for Chronic Kidney Disease (CKD):     Stage 1 with normal or high GFR (GFR > 90 mL/min/1.73 square meters)    Stage 2 Mild CKD (GFR = 60-89 mL/min/1.73 square meters)    Stage 3A Moderate CKD (GFR = 45-59 mL/min/1.73 square meters)    Stage 3B Moderate CKD (GFR = 30-44 mL/min/1.73 square meters)    Stage 4 Severe CKD (GFR = 15-29 mL/min/1.73 square meters)    Stage 5 End Stage CKD (GFR <15 mL/min/1.73 square meters)  Note: GFR calculation is accurate only with a steady state creatinine CBC and differential [[id number]]  (Abnormal) Collected: 10/30/23 1201    Lab Status: Final result Specimen: Blood from Arm, Left Updated: 10/30/23 1208     WBC 7.70 Thousand/uL      RBC 4.40 Million/uL      Hemoglobin 14.5 g/dL      Hematocrit 42.4 %      MCV 96 fL      MCH 33.0 pg      MCHC 34.2 g/dL      RDW 11.8 %      MPV 9.3 fL      Platelets 916 Thousands/uL      nRBC 0 /100 WBCs      Neutrophils Relative 46 %      Immat GRANS % 0 %      Lymphocytes Relative 39 %      Monocytes Relative 13 %      Eosinophils Relative 1 %      Basophils Relative 1 %      Neutrophils Absolute 3.52 Thousands/µL      Immature Grans Absolute 0.02 Thousand/uL      Lymphocytes Absolute 2.98 Thousands/µL      Monocytes Absolute 1.00 Thousand/µL      Eosinophils Absolute 0.09 Thousand/µL      Basophils Absolute 0.09 Thousands/µL                    No orders to display              Procedures  ECG 12 Lead Documentation Only    Date/Time: 10/30/2023 11:16 AM    Performed by: Felecia Barfield DO  Authorized by: Felecia Barfield DO    ECG reviewed by me, the ED Provider: yes    Patient location:  ED  Previous ECG:     Previous ECG:  Compared to current    Similarity:  No change    Comparison to cardiac monitor: Yes    Rate:     ECG rate assessment: normal    Rhythm:     Rhythm: sinus rhythm    Ectopy:     Ectopy: none    QRS:     QRS axis:  Normal    QRS intervals:  Normal  Conduction:     Conduction: normal    ST segments:     ST segments:  Normal  T waves:     T waves: normal    Comments:      Low voltage           ED Course                                             Medical Decision Making  19-year-old female with vague feelings of generalized weakness and occasional lightheadedness presents with some chest tightness as well does not sound likely due to cardiac etiology. Concern for low-grade viral syndrome, dehydration, electrolyte abnormality, anemia, UTI. Doubt ACS, dysrhythmia, autoimmune disorder.   Will check labs and EKG.    Amount and/or Complexity of Data Reviewed  Labs: ordered. Disposition  Final diagnoses:   Atypical chest pain   Fatigue   Thrombocytosis     Time reflects when diagnosis was documented in both MDM as applicable and the Disposition within this note       Time User Action Codes Description Comment    10/30/2023  1:10 PM Isabel Arango [R07.89] Atypical chest pain     10/30/2023  1:11 PM Isabel Pacheco Add [R53.83] Fatigue     10/30/2023  1:11 PM Isabel Pacheco Add [G36.322] Thrombocytosis           ED Disposition       ED Disposition   Discharge    Condition   Stable    Date/Time   Mon Oct 30, 2023  1:10 PM    Comment   Kristine Sim discharge to home/self care. Follow-up Information       Follow up With Specialties Details Why Contact Info    Milvia Sheldon MD Internal Medicine Call  As needed [address] [facility]  [phone]              Discharge Medication List as of 10/30/2023  1:12 PM        CONTINUE these medications which have NOT CHANGED    Details   cholecalciferol (VITAMIN D3) 1,000 units tablet Take 1,000 Units by mouth daily, Historical Med      diphenhydrAMINE (BENADRYL) 50 MG tablet Take 1 tablet (50 mg total) by mouth every 6 (six) hours as needed for itching or allergies (rash), Starting Thu 10/12/2023, Normal      EPINEPHrine (EPIPEN) 0.3 mg/0.3 mL SOAJ Inject 0.3 mL (0.3 mg total) into a muscle once for 1 dose, Starting Thu 10/12/2023, Normal      famotidine (PEPCID) 20 mg tablet Take 20 mg by mouth 2 (two) times a day, Historical Med      ibuprofen (MOTRIN) 600 mg tablet Take 1 tablet (600 mg total) by mouth every 8 (eight) hours for 5 days, Starting Mon 1/11/2021, Until Sat 1/16/2021, No Print             No discharge procedures on file.     PDMP Review       None            ED Provider  Electronically Signed by             Isabel Pacheco DO  11/02/23 1026

## 2023-10-30 NOTE — DISCHARGE INSTRUCTIONS
Today's labs and exam are unremarkable. There is no sign of heart attack. It is unclear what is causing your symptoms. Follow-up with your PCP and with the oncologist as scheduled.

## 2023-11-13 NOTE — PROGRESS NOTES
Hematology/Oncology Outpatient Consult Note  Ralph Daugherty 63 y.o. female MRN: @ Encounter: [id number]        Date:  11/15/2023        CC: Thrombocytosis      HPI:  Ralph Daugherty is a 32-year-old female with history of mitral valve prolapse, GERD who is referred to hematology for evaluation of elevated platelet count. She is being seen for initial consultation 11/15/2023     Blood work on file reviewed. She has evidence of chronic thrombocytosis dating back to 2021. Her platelet count has fluctuated between 450 and 490. No other CBC abnormalities noted. White count, hemoglobin, differential has been normal  She presents today with labs dating back to 2007 which also demonstrate thrombocytosis. Between the years 2007 and 2012 her platelets ranged between 420-550     Most recent CBC from 10/30/2023 showed white count 7.7, hemoglobin 14.5, MCV 96, platelets 974, normal differential    Patient states her platelets have always been elevated and no one ever thought to have this worked up for her before. She is concerned about symptoms she has been experiencing since at least 2001 and is concerned she may have pernicious anemia/B12 deficiency. She states pernicious anemia runs in her family. She has no evidence of anemia on any of her CBCs. However she has had some macrocytosis from time to time  She has symptoms of sore tongue, mild tremors, numbness and tingling in her legs, generalized malaise. She states in the past, she sought care at [facility] for B12 injections. She stopped going there when she developed anaphylaxis from "an allergy shot"    He was seen in the ED several times over the last couple of months for various symptoms including foot pain, chest pain, generalized weakness. Her work-up has been benign      She is up to date on cancer screening. Test Results:    Imaging: No results found.     Labs:   Lab Results   Component Value Date    WBC 7.70 10/30/2023 HGB 14.5 10/30/2023    HCT 42.4 10/30/2023    MCV 96 10/30/2023     (H) 10/30/2023     Lab Results   Component Value Date    K 3.7 10/30/2023     10/30/2023    CO2 25 10/30/2023    BUN 11 10/30/2023    CREATININE 0.76 10/30/2023    GLUF 123 (H) 01/10/2021    CALCIUM 10.1 10/30/2023    AST 22 10/30/2023    ALT 19 10/30/2023    ALKPHOS 63 10/30/2023    EGFR 85 10/30/2023             ROS:  Review of Systems   Constitutional:  Positive for fatigue. HENT:          Tongue pain, "soreness"   Musculoskeletal:  Positive for arthralgias. Neurological:  Positive for tremors, weakness and numbness. Psychiatric/Behavioral:  The patient is nervous/anxious. Active Problems:   Patient Active Problem List   Diagnosis    Chest pain w/ palpitations     Abdominal pain    Urinary frequency    Right leg pain    Dizziness    Thrombocytosis       Past Medical History:   Past Medical History:   Diagnosis Date    BRCA1 negative     BRCA2 negative     GERD (gastroesophageal reflux disease)     Mitral valve prolapse        Surgical History: History reviewed. No pertinent surgical history.     Family History:    Family History   Problem Relation Age of Onset    Lymphoma Mother 68    Breast cancer Mother 46    Diabetes Father     Arthritis Father     No Known Problems Sister     No Known Problems Daughter     No Known Problems Daughter     Heart attack Maternal Grandmother     Stroke Maternal Grandmother     Breast cancer Maternal Grandfather         estimate 67    Lung cancer Maternal Grandfather         Early 70's    Heart disease Paternal Grandmother     Alcohol abuse Paternal Grandfather     Breast cancer Maternal Aunt         guessing early 42's    No Known Problems Maternal Aunt     No Known Problems Paternal Aunt     Skin cancer Brother 61       Cancer-related family history includes Breast cancer in her maternal aunt and maternal grandfather; Breast cancer (age of onset: 46) in her mother; Lung cancer in her maternal grandfather; Lymphoma (age of onset: 68) in her mother; Skin cancer (age of onset: 61) in her brother. Social History:   Social History     Socioeconomic History    Marital status: /Civil Union     Spouse name: Not on file    Number of children: Not on file    Years of education: Not on file    Highest education level: Not on file   Occupational History    Not on file   Tobacco Use    Smoking status: Former    Smokeless tobacco: Never    Tobacco comments:     quit 34 years ago   Vaping Use    Vaping Use: Never used   Substance and Sexual Activity    Alcohol use: Yes     Comment: socially    Drug use: Never    Sexual activity: Yes     Partners: Male     Birth control/protection: Post-menopausal   Other Topics Concern    Not on file   Social History Narrative    Not on file     Social Determinants of Health     Financial Resource Strain: Not on file   Food Insecurity: Not on file   Transportation Needs: Not on file   Physical Activity: Not on file   Stress: Not on file   Social Connections: Not on file   Intimate Partner Violence: Not on file   Housing Stability: Not on file       Current Medications:   Current Outpatient Medications   Medication Sig Dispense Refill    cholecalciferol (VITAMIN D3) 1,000 units tablet Take 1,000 Units by mouth daily      diphenhydrAMINE (BENADRYL) 50 MG tablet Take 1 tablet (50 mg total) by mouth every 6 (six) hours as needed for itching or allergies (rash) 30 tablet 0    EPINEPHrine (EPIPEN) 0.3 mg/0.3 mL SOAJ Inject 0.3 mL (0.3 mg total) into a muscle once for 1 dose 0.6 mL 0    famotidine (PEPCID) 20 mg tablet Take 20 mg by mouth 2 (two) times a day (Patient not taking: Reported on 2/15/2023)      ibuprofen (MOTRIN) 600 mg tablet Take 1 tablet (600 mg total) by mouth every 8 (eight) hours for 5 days (Patient not taking: Reported on 2/15/2023) 30 tablet 0     No current facility-administered medications for this visit. Allergies:    Allergies   Allergen Reactions    Codeine Vomiting    Latex Itching    Other Dizziness, Lightheadedness and Palpitations         Physical Exam:    Body surface area is 1.77 meters squared. Wt Readings from Last 3 Encounters:   11/15/23 73.5 kg (162 lb)   10/12/23 68.9 kg (152 lb)   09/09/23 68.9 kg (152 lb)        Temp Readings from Last 3 Encounters:   11/15/23 98.2 °F (36.8 °C) (Temporal)   10/30/23 97.9 °F (36.6 °C) (Temporal)   10/12/23 97.9 °F (36.6 °C) (Temporal)        BP Readings from Last 3 Encounters:   11/15/23 146/74   10/30/23 136/77   10/12/23 129/66         Pulse Readings from Last 3 Encounters:   11/15/23 97   10/30/23 77   10/12/23 75          Physical Exam  Constitutional:       General: She is not in acute distress. Appearance: Normal appearance. HENT:      Head: Normocephalic and atraumatic. Mouth/Throat:      Mouth: Mucous membranes are moist. No oral lesions. Tongue: No lesions. Eyes:      General: No scleral icterus. Right eye: No discharge. Left eye: No discharge. Conjunctiva/sclera: Conjunctivae normal.   Cardiovascular:      Rate and Rhythm: Normal rate and regular rhythm. Pulmonary:      Effort: Pulmonary effort is normal. No respiratory distress. Breath sounds: Normal breath sounds. Abdominal:      General: Bowel sounds are normal. There is no distension. Palpations: Abdomen is soft. There is no mass. Tenderness: There is no abdominal tenderness. Musculoskeletal:         General: Normal range of motion. Lymphadenopathy:      Cervical: No cervical adenopathy. Upper Body:      Right upper body: No supraclavicular, axillary or pectoral adenopathy. Left upper body: No supraclavicular, axillary or pectoral adenopathy. Skin:     General: Skin is warm and dry. Neurological:      General: No focal deficit present. Mental Status: She is alert and oriented to person, place, and time.    Psychiatric:         Mood and Affect: Mood normal. Behavior: Behavior normal.              Assessment/ Plan:    1. Myeloproliferative disorder ([address])    2. Thrombocytosis    3. B12 deficiency    Patient is a 12year-old female with chronic thrombocytosis dating back to at least 2007 on records I reviewed today. Her baseline platelet count appears to run around 450. Has fluctuated as high as 550 in the past.  I reviewed possible etiologies for thrombocytosis today to include reactive process secondary to inflammation, iron deficiency versus myeloproliferative disorder. I have recommended checking blood work to include repeating CBC, inflammatory markers, iron panel as well as myeloproliferative work-up to rule out JAK2 positive thrombocytosis  I explained with her platelet count averaging in the 400s there are is no treatment indicated other than observation. She could take a daily baby aspirin. She is concerned about possible B12 deficiency and describes multiple symptoms typically associated with B12 deficiency. I will check serum B12, MMA  We will work on obtaining insurance authorization for myeloproliferative blood work. She will be notified once this is obtained and have all of her labs done at the same time. I will see her back with results of lab studies to discuss and provide recommendations. Patient is in agreement. She is instructed to call with any questions or concerns in the interim    Portions of the record may have been created with voice recognition software. Occasional wrong word or "sound a like" substitutions may have occurred due to the inherent limitations of voice recognition software. Read the chart carefully and recognize, using context, where substitutions have occurred.

## 2023-11-15 ENCOUNTER — OFFICE VISIT (OUTPATIENT)
Age: 63
End: 2023-11-15
Payer: COMMERCIAL

## 2023-11-15 ENCOUNTER — TELEPHONE (OUTPATIENT)
Age: 63
End: 2023-11-15

## 2023-11-15 VITALS
HEIGHT: 63 IN | OXYGEN SATURATION: 99 % | WEIGHT: 162 LBS | RESPIRATION RATE: 18 BRPM | HEART RATE: 97 BPM | BODY MASS INDEX: 28.7 KG/M2 | SYSTOLIC BLOOD PRESSURE: 146 MMHG | DIASTOLIC BLOOD PRESSURE: 74 MMHG | TEMPERATURE: 98.2 F

## 2023-11-15 DIAGNOSIS — D75.839 THROMBOCYTOSIS: ICD-10-CM

## 2023-11-15 DIAGNOSIS — E53.8 B12 DEFICIENCY: ICD-10-CM

## 2023-11-15 DIAGNOSIS — D47.1 MYELOPROLIFERATIVE DISORDER (HCC): Primary | ICD-10-CM

## 2023-11-15 PROCEDURE — 99214 OFFICE O/P EST MOD 30 MIN: CPT | Performed by: NURSE PRACTITIONER

## 2023-11-15 NOTE — TELEPHONE ENCOUNTER
Called patient regarding appt we scheduled for her.   Left her a voicemail that we scheduled her follow up for 12/14/2023 at 130 PM with arrival time of 115 PM.

## 2023-11-27 ENCOUNTER — TELEPHONE (OUTPATIENT)
Age: 63
End: 2023-11-27

## 2023-11-28 NOTE — TELEPHONE ENCOUNTER
[address] [place] [address] Dr Agustin authorized and I saw it was already scheduled. Called patient back and left a message with my teams number in case she needs to call me back.

## 2023-12-06 ENCOUNTER — APPOINTMENT (OUTPATIENT)
Dept: LAB | Facility: HOSPITAL | Age: 63
End: 2023-12-06
Payer: COMMERCIAL

## 2023-12-06 DIAGNOSIS — D75.839 THROMBOCYTOSIS: ICD-10-CM

## 2023-12-06 DIAGNOSIS — D47.1 MYELOPROLIFERATIVE DISORDER (HCC): ICD-10-CM

## 2023-12-06 DIAGNOSIS — E53.8 B12 DEFICIENCY: ICD-10-CM

## 2023-12-06 LAB
BASOPHILS # BLD AUTO: 0.11 THOUSANDS/ÂΜL (ref 0–0.1)
BASOPHILS NFR BLD AUTO: 1 % (ref 0–1)
CRP SERPL QL: 1.3 MG/L
EOSINOPHIL # BLD AUTO: 0.19 THOUSAND/ÂΜL (ref 0–0.61)
EOSINOPHIL NFR BLD AUTO: 2 % (ref 0–6)
ERYTHROCYTE [DISTWIDTH] IN BLOOD BY AUTOMATED COUNT: 11.7 % (ref 11.6–15.1)
ERYTHROCYTE [SEDIMENTATION RATE] IN BLOOD: 27 MM/HOUR (ref 0–29)
FERRITIN SERPL-MCNC: 173 NG/ML (ref 11–307)
HCT VFR BLD AUTO: 44 % (ref 34.8–46.1)
HGB BLD-MCNC: 14.7 G/DL (ref 11.5–15.4)
IMM GRANULOCYTES # BLD AUTO: 0.03 THOUSAND/UL (ref 0–0.2)
IMM GRANULOCYTES NFR BLD AUTO: 0 % (ref 0–2)
IRON SATN MFR SERPL: 35 % (ref 15–50)
IRON SERPL-MCNC: 148 UG/DL (ref 50–212)
LYMPHOCYTES # BLD AUTO: 3.8 THOUSANDS/ÂΜL (ref 0.6–4.47)
LYMPHOCYTES NFR BLD AUTO: 49 % (ref 14–44)
MCH RBC QN AUTO: 32.9 PG (ref 26.8–34.3)
MCHC RBC AUTO-ENTMCNC: 33.4 G/DL (ref 31.4–37.4)
MCV RBC AUTO: 98 FL (ref 82–98)
MONOCYTES # BLD AUTO: 1.01 THOUSAND/ÂΜL (ref 0.17–1.22)
MONOCYTES NFR BLD AUTO: 13 % (ref 4–12)
NEUTROPHILS # BLD AUTO: 2.72 THOUSANDS/ÂΜL (ref 1.85–7.62)
NEUTS SEG NFR BLD AUTO: 35 % (ref 43–75)
NRBC BLD AUTO-RTO: 0 /100 WBCS
PLATELET # BLD AUTO: 525 THOUSANDS/UL (ref 149–390)
PMV BLD AUTO: 8.8 FL (ref 8.9–12.7)
RBC # BLD AUTO: 4.47 MILLION/UL (ref 3.81–5.12)
TIBC SERPL-MCNC: 421 UG/DL (ref 250–450)
UIBC SERPL-MCNC: 273 UG/DL (ref 155–355)
VIT B12 SERPL-MCNC: 391 PG/ML (ref 180–914)
WBC # BLD AUTO: 7.86 THOUSAND/UL (ref 4.31–10.16)

## 2023-12-06 PROCEDURE — 81270 JAK2 GENE: CPT

## 2023-12-06 PROCEDURE — 83540 ASSAY OF IRON: CPT

## 2023-12-06 PROCEDURE — 82607 VITAMIN B-12: CPT

## 2023-12-06 PROCEDURE — 83550 IRON BINDING TEST: CPT

## 2023-12-06 PROCEDURE — 85652 RBC SED RATE AUTOMATED: CPT

## 2023-12-06 PROCEDURE — 83918 ORGANIC ACIDS TOTAL QUANT: CPT

## 2023-12-06 PROCEDURE — 82728 ASSAY OF FERRITIN: CPT

## 2023-12-06 PROCEDURE — 86140 C-REACTIVE PROTEIN: CPT

## 2023-12-06 PROCEDURE — 85025 COMPLETE CBC W/AUTO DIFF WBC: CPT

## 2023-12-06 PROCEDURE — 36415 COLL VENOUS BLD VENIPUNCTURE: CPT

## 2023-12-12 LAB — METHYLMALONATE SERPL-SCNC: 87 NMOL/L (ref 0–378)

## 2023-12-13 LAB
CALR RESULT: NORMAL
CITATION REF LAB TEST: NORMAL
E12-15 RESULT: NORMAL
JAK2 P.V617F BLD/T QL: NORMAL
LAB DIRECTOR NAME PROVIDER: NORMAL
MPL RESULT: NORMAL
REF LAB TEST METHOD: NORMAL
SL AMB REFLEX: NORMAL
TEST PERFORMANCE INFO SPEC: NORMAL

## 2023-12-14 ENCOUNTER — OFFICE VISIT (OUTPATIENT)
Age: 63
End: 2023-12-14
Payer: COMMERCIAL

## 2023-12-14 ENCOUNTER — TELEPHONE (OUTPATIENT)
Age: 63
End: 2023-12-14

## 2023-12-14 VITALS
RESPIRATION RATE: 18 BRPM | OXYGEN SATURATION: 99 % | SYSTOLIC BLOOD PRESSURE: 142 MMHG | DIASTOLIC BLOOD PRESSURE: 88 MMHG | HEIGHT: 63 IN | WEIGHT: 162 LBS | HEART RATE: 82 BPM | TEMPERATURE: 98.1 F | BODY MASS INDEX: 28.7 KG/M2

## 2023-12-14 DIAGNOSIS — E53.8 B12 DEFICIENCY: ICD-10-CM

## 2023-12-14 DIAGNOSIS — D75.839 THROMBOCYTOSIS: Primary | ICD-10-CM

## 2023-12-14 PROCEDURE — 99214 OFFICE O/P EST MOD 30 MIN: CPT | Performed by: NURSE PRACTITIONER

## 2023-12-14 RX ORDER — ASPIRIN 81 MG/1
TABLET ORAL
COMMUNITY
Start: 2023-11-15

## 2023-12-14 RX ORDER — CYANOCOBALAMIN 1000 UG/ML
1000 INJECTION, SOLUTION INTRAMUSCULAR; SUBCUTANEOUS ONCE
Status: CANCELLED | OUTPATIENT
Start: 2023-12-18

## 2023-12-14 NOTE — PROGRESS NOTES
HEMATOLOGY / [address] [place] [address] FOLLOW UP NOTE    Primary Care Provider: Hugo Azevedo MD  Referring Provider:    MRN: [id number]  : 1960    Reason for Encounter: Follow-up for thrombocytosis       Interval History: Patient presents for follow-up to review workup ordered for elevated platelet count. Myeloproliferative workup resulted with negative findings. No evidence of JAK2, PHIL R, MPL mutation seen. Iron panel was also within normal limits. Serum iron 148, ferritin 173  B12, MMA are normal.  CRP, sed rate is normal  Repeat CBC with differential continues to demonstrate isolated thrombocytosis with a platelet count of 371. She is taking a daily baby aspirin. She continues to endorse symptoms that she associates with B12 deficiency including sore tongue, generalized malaise and numbness and tingling in her legs. She did note improvement in the symptoms when given parental B12 in the past.  He has no significant B12 deficiency on recent labs, however her serum B12 level is below 400 and this can sometimes result in symptoms for some patients. REVIEW OF SYSTEMS:  Please note that a 14-point review of systems was performed to include Constitutional, HEENT, Respiratory, CVS, GI, , Musculoskeletal, Integumentary, Neurologic, Rheumatologic, Endocrinologic, Psychiatric, Lymphatic, and Hematologic/Oncologic systems were reviewed and are negative unless otherwise stated in HPI. Positive and negative findings pertinent to this evaluation are incorporated into the history of present illness. ECOG PS: 0    PROBLEM LIST:  Patient Active Problem List   Diagnosis    Chest pain w/ palpitations     Abdominal pain    Urinary frequency    Right leg pain    Dizziness    Thrombocytosis    B12 deficiency       Assessment / Plan:  1. Thrombocytosis    2. B12 deficiency      Patient is a pleasant 72-year-old female with chronic thrombocytosis dating back to at least  on records.   Her baseline platelet count appears to run around 450 but has fluctuated as high as 550 in the past.  Hematologic workup is negative for myeloproliferative disorder. No evidence of iron deficiency or evidence of inflammation. Etiology for elevated platelet count unclear. She is taking a daily baby aspirin and should continue. Since her platelet count remains in a safe and stable range no other intervention is required at this time. She is concerned about her serum B12 levels and describes multiple symptoms typically associated with B12 deficiency. Her MMA is normal.  Serum B12 391. Since her B12 is less than 400 and low normal B12 levels can result in symptoms for some patients I have recommended a trial of monthly injectable B12 replacement to see if this helps. Patient is very appreciative of this and in agreement. I will set her up for injectable B12 1000 mcg monthly. I will see her back in 3 months to see how she is feeling with repeat blood work. She is instructed to call anytime with questions or concerns in the interim    I spent 30 minutes on chart review, face to face counseling time, coordination of care and documentation. Past Medical History:   has a past medical history of BRCA1 negative, BRCA2 negative, GERD (gastroesophageal reflux disease), and Mitral valve prolapse. PAST SURGICAL HISTORY:   has no past surgical history on file.     CURRENT MEDICATIONS  Current Outpatient Medications   Medication Sig Dispense Refill    aspirin (Adult Aspirin Regimen) 81 mg EC tablet       cholecalciferol (VITAMIN D3) 1,000 units tablet Take 1,000 Units by mouth daily      diphenhydrAMINE (BENADRYL) 50 MG tablet Take 1 tablet (50 mg total) by mouth every 6 (six) hours as needed for itching or allergies (rash) 30 tablet 0    EPINEPHrine (EPIPEN) 0.3 mg/0.3 mL SOAJ Inject 0.3 mL (0.3 mg total) into a muscle once for 1 dose 0.6 mL 0    famotidine (PEPCID) 20 mg tablet Take 20 mg by mouth 2 (two) times a day (Patient not taking: Reported on 2/15/2023)      ibuprofen (MOTRIN) 600 mg tablet Take 1 tablet (600 mg total) by mouth every 8 (eight) hours for 5 days (Patient not taking: Reported on 2/15/2023) 30 tablet 0     No current facility-administered medications for this visit. [unfilled]    SOCIAL HISTORY:   reports that she has quit smoking. She has never used smokeless tobacco. She reports current alcohol use. She reports that she does not use drugs. FAMILY HISTORY:  family history includes Alcohol abuse in her paternal grandfather; Arthritis in her father; Breast cancer in her maternal aunt and maternal grandfather; Breast cancer (age of onset: 46) in her mother; Diabetes in her father; Heart attack in her maternal grandmother; Heart disease in her paternal grandmother; Lung cancer in her maternal grandfather; Lymphoma (age of onset: 68) in her mother; No Known Problems in her daughter, daughter, maternal aunt, paternal aunt, and sister; Skin cancer (age of onset: 61) in her brother; Stroke in her maternal grandmother. ALLERGIES:  is allergic to codeine, latex, and other. Physical Exam:  Vital Signs:   Visit Vitals  /88 (BP Location: Right arm, Patient Position: Sitting, Cuff Size: Standard)   Pulse 82   Temp 98.1 °F (36.7 °C) (Temporal)   Resp 18   Ht 5' 3" (1.6 m)   Wt 73.5 kg (162 lb)   SpO2 99%   BMI 28.70 kg/m²   OB Status Postmenopausal   Smoking Status Former   BSA 1.77 m²     Body mass index is 28.7 kg/m². Body surface area is 1.77 meters squared. Physical Exam  Constitutional:       General: She is not in acute distress. Appearance: Normal appearance. HENT:      Head: Normocephalic and atraumatic. Eyes:      General: No scleral icterus. Right eye: No discharge. Left eye: No discharge. Conjunctiva/sclera: Conjunctivae normal.   Cardiovascular:      Rate and Rhythm: Normal rate and regular rhythm. Pulmonary:      Effort: Pulmonary effort is normal. No respiratory distress. Breath sounds: Normal breath sounds. Abdominal:      General: Bowel sounds are normal. There is no distension. Palpations: Abdomen is soft. There is no mass. Tenderness: There is no abdominal tenderness. Musculoskeletal:         General: Normal range of motion. Lymphadenopathy:      Cervical: No cervical adenopathy. Upper Body:      Right upper body: No supraclavicular, axillary or pectoral adenopathy. Left upper body: No supraclavicular, axillary or pectoral adenopathy. Skin:     General: Skin is warm and dry. Neurological:      General: No focal deficit present. Mental Status: She is alert and oriented to person, place, and time.    Psychiatric:         Mood and Affect: Mood normal.         Behavior: Behavior normal.         Labs:  Lab Results   Component Value Date    WBC 7.86 12/06/2023    HGB 14.7 12/06/2023    HCT 44.0 12/06/2023    MCV 98 12/06/2023     (H) 12/06/2023     Lab Results   Component Value Date    SODIUM 140 10/30/2023    K 3.7 10/30/2023     10/30/2023    CO2 25 10/30/2023    AGAP 9 10/30/2023    BUN 11 10/30/2023    CREATININE 0.76 10/30/2023    GLUC 110 10/30/2023    GLUF 123 (H) 01/10/2021    CALCIUM 10.1 10/30/2023    AST 22 10/30/2023    ALT 19 10/30/2023    ALKPHOS 63 10/30/2023    TP 7.7 10/30/2023    TBILI 0.49 10/30/2023    EGFR 85 10/30/2023

## 2023-12-15 DIAGNOSIS — E53.8 B12 DEFICIENCY: Primary | ICD-10-CM

## 2023-12-15 RX ORDER — CYANOCOBALAMIN 1000 UG/ML
1000 INJECTION, SOLUTION INTRAMUSCULAR; SUBCUTANEOUS ONCE
Status: CANCELLED | OUTPATIENT
Start: 2023-12-20

## 2023-12-15 NOTE — TELEPHONE ENCOUNTER
Left detailed message per patient request with all dates & times. Can view on [employer], gave my Teams # to call back for questions.

## 2023-12-15 NOTE — TELEPHONE ENCOUNTER
What would be a preferred day of the week that would work best for your infusion appointment? Wednesday  Do you prefer mornings or afternoons for your appointments? After 12pm  Are there any days or dates that do not work for your schedule, including any upcoming vacations? N/a  We are going to try our best to schedule you at the infusion center closest to your home. In the event that we are unable to what would be your next preferred infusion site or sites? [facility]  Do you have transportation to take you to all of your appointments?  yes

## 2023-12-20 ENCOUNTER — HOSPITAL ENCOUNTER (OUTPATIENT)
Dept: INFUSION CENTER | Facility: HOSPITAL | Age: 63
Discharge: HOME | End: 2023-12-20
Attending: INTERNAL MEDICINE
Payer: COMMERCIAL

## 2023-12-20 VITALS
DIASTOLIC BLOOD PRESSURE: 84 MMHG | TEMPERATURE: 98.1 F | SYSTOLIC BLOOD PRESSURE: 165 MMHG | HEART RATE: 79 BPM | RESPIRATION RATE: 16 BRPM

## 2023-12-20 DIAGNOSIS — E53.8 B12 DEFICIENCY: Primary | ICD-10-CM

## 2023-12-20 PROCEDURE — 96372 THER/PROPH/DIAG INJ SC/IM: CPT

## 2023-12-20 RX ORDER — CYANOCOBALAMIN 1000 UG/ML
1000 INJECTION, SOLUTION INTRAMUSCULAR; SUBCUTANEOUS ONCE
Status: COMPLETED | OUTPATIENT
Start: 2023-12-20 | End: 2023-12-20

## 2023-12-20 RX ORDER — CYANOCOBALAMIN 1000 UG/ML
1000 INJECTION, SOLUTION INTRAMUSCULAR; SUBCUTANEOUS ONCE
OUTPATIENT
Start: 2023-12-22

## 2023-12-20 RX ADMIN — CYANOCOBALAMIN 1000 MCG: 1000 INJECTION, SOLUTION INTRAMUSCULAR at 13:59

## 2023-12-20 NOTE — PROGRESS NOTES
Patient received B12 injection without complication. Patient stated she was extremely nervous about injection and was educated on the process which she stated helped. Patient discharged in stable condition to home. Patient verified next appointment- 1/17/23 at 1430 and declined AVS at this time.

## 2024-01-17 ENCOUNTER — HOSPITAL ENCOUNTER (OUTPATIENT)
Dept: INFUSION CENTER | Facility: HOSPITAL | Age: 64
End: 2024-01-17
Attending: INTERNAL MEDICINE

## 2024-02-08 DIAGNOSIS — E53.8 B12 DEFICIENCY: Primary | ICD-10-CM

## 2024-02-08 RX ORDER — CYANOCOBALAMIN 1000 UG/ML
1000 INJECTION, SOLUTION INTRAMUSCULAR; SUBCUTANEOUS ONCE
OUTPATIENT
Start: 2024-02-14

## 2024-02-12 ENCOUNTER — TELEPHONE (OUTPATIENT)
Dept: HEMATOLOGY ONCOLOGY | Facility: CLINIC | Age: 64
End: 2024-02-12

## 2024-02-12 NOTE — TELEPHONE ENCOUNTER
Called and spoke to patient. I let her know that I will fax her last office note to her PCP office today. Also, she started taking methyl cobalamin 1mg daily in place of her B12 shots. I let her know that I would update [facility] with this information. Appt scheduled to follow up with Sandra on 3/14. No other needs at this time.

## 2024-02-12 NOTE — TELEPHONE ENCOUNTER
Patient Call    Who are you speaking with? Patient    If it is not the patient, are they listed on an active communication consent form? N/A   What is the reason for this call? Patient wanted to inform us she stooped getting her B12 shots due to taking a supplement which seems to be helping her out.      She also said her records have not been sent over to her PCP ([facility]) Nilda Downey  NP   Does this require a call back? Yes   If a call back is required, please list best call back number [phone]   If a call back is required, advise that a message will be forwarded to their care team and someone will return their call as soon as possible.   Did you relay this information to the patient? Yes

## 2024-02-14 ENCOUNTER — HOSPITAL ENCOUNTER (OUTPATIENT)
Dept: INFUSION CENTER | Facility: HOSPITAL | Age: 64
End: 2024-02-14
Attending: INTERNAL MEDICINE

## 2024-03-06 ENCOUNTER — TELEPHONE (OUTPATIENT)
Dept: HEMATOLOGY ONCOLOGY | Facility: CLINIC | Age: 64
End: 2024-03-06

## 2024-03-06 NOTE — TELEPHONE ENCOUNTER
Appointment Change  Cancel, Reschedule, Change to Virtual      Who are you speaking with? Patient   If it is not the patient, is the caller listed on the communication consent form? Yes   Which provider is the appointment scheduled with? [facility]   When was the original appointment scheduled?    Please list date and time 3/14   At which location is the appointment scheduled to take place? [place]   Was the appointment rescheduled?     Was the appointment changed from an in person visit to a virtual visit?    If so, please list the details of the change. 4/25 11:30am   What is the reason for the appointment change? Provider away       Was [facility] transport scheduled? No   Does [facility] transport need to be scheduled for the new visit (if applicable) No   Does the patient need an infusion appointment rescheduled? No   Does the patient have an upcoming infusion appointment scheduled? If so, when? No   Is the patient undergoing chemotherapy? No   For appointments cancelled with less than 24 hours:  Was the no-show policy reviewed? Yes

## 2024-03-06 NOTE — TELEPHONE ENCOUNTER
Left message for pt to call [facility] regarding appt scheduled on 3/14/24 with Giselle Trimble. That appt does need to be rescheduled due to provider scheduling conflict. Another attempt will be made to contact pt.

## 2024-04-17 ENCOUNTER — APPOINTMENT (OUTPATIENT)
Dept: LAB | Facility: HOSPITAL | Age: 64
End: 2024-04-17
Payer: COMMERCIAL

## 2024-04-17 DIAGNOSIS — E53.8 B12 DEFICIENCY: ICD-10-CM

## 2024-04-17 DIAGNOSIS — D75.839 THROMBOCYTOSIS: ICD-10-CM

## 2024-04-17 LAB
ALBUMIN SERPL BCP-MCNC: 4.6 G/DL (ref 3.5–5)
ALP SERPL-CCNC: 73 U/L (ref 34–104)
ALT SERPL W P-5'-P-CCNC: 18 U/L (ref 7–52)
ANION GAP SERPL CALCULATED.3IONS-SCNC: 5 MMOL/L (ref 4–13)
AST SERPL W P-5'-P-CCNC: 21 U/L (ref 13–39)
BASOPHILS # BLD AUTO: 0.14 THOUSANDS/ÂΜL (ref 0–0.1)
BASOPHILS NFR BLD AUTO: 2 % (ref 0–1)
BILIRUB SERPL-MCNC: 0.46 MG/DL (ref 0.2–1)
BUN SERPL-MCNC: 14 MG/DL (ref 5–25)
CALCIUM SERPL-MCNC: 10.2 MG/DL (ref 8.4–10.2)
CHLORIDE SERPL-SCNC: 104 MMOL/L (ref 96–108)
CO2 SERPL-SCNC: 29 MMOL/L (ref 21–32)
CREAT SERPL-MCNC: 0.76 MG/DL (ref 0.6–1.3)
EOSINOPHIL # BLD AUTO: 0.38 THOUSAND/ÂΜL (ref 0–0.61)
EOSINOPHIL NFR BLD AUTO: 5 % (ref 0–6)
ERYTHROCYTE [DISTWIDTH] IN BLOOD BY AUTOMATED COUNT: 11.9 % (ref 11.6–15.1)
GFR SERPL CREATININE-BSD FRML MDRD: 84 ML/MIN/1.73SQ M
GLUCOSE P FAST SERPL-MCNC: 131 MG/DL (ref 65–99)
HCT VFR BLD AUTO: 44.5 % (ref 34.8–46.1)
HGB BLD-MCNC: 15.2 G/DL (ref 11.5–15.4)
IMM GRANULOCYTES # BLD AUTO: 0.01 THOUSAND/UL (ref 0–0.2)
IMM GRANULOCYTES NFR BLD AUTO: 0 % (ref 0–2)
LYMPHOCYTES # BLD AUTO: 3.09 THOUSANDS/ÂΜL (ref 0.6–4.47)
LYMPHOCYTES NFR BLD AUTO: 40 % (ref 14–44)
MCH RBC QN AUTO: 33.3 PG (ref 26.8–34.3)
MCHC RBC AUTO-ENTMCNC: 34.2 G/DL (ref 31.4–37.4)
MCV RBC AUTO: 98 FL (ref 82–98)
MONOCYTES # BLD AUTO: 1.09 THOUSAND/ÂΜL (ref 0.17–1.22)
MONOCYTES NFR BLD AUTO: 14 % (ref 4–12)
NEUTROPHILS # BLD AUTO: 3.08 THOUSANDS/ÂΜL (ref 1.85–7.62)
NEUTS SEG NFR BLD AUTO: 39 % (ref 43–75)
NRBC BLD AUTO-RTO: 0 /100 WBCS
PLATELET # BLD AUTO: 505 THOUSANDS/UL (ref 149–390)
PMV BLD AUTO: 9.1 FL (ref 8.9–12.7)
POTASSIUM SERPL-SCNC: 4.8 MMOL/L (ref 3.5–5.3)
PROT SERPL-MCNC: 8 G/DL (ref 6.4–8.4)
RBC # BLD AUTO: 4.56 MILLION/UL (ref 3.81–5.12)
SODIUM SERPL-SCNC: 138 MMOL/L (ref 135–147)
VIT B12 SERPL-MCNC: 740 PG/ML (ref 180–914)
WBC # BLD AUTO: 7.79 THOUSAND/UL (ref 4.31–10.16)

## 2024-04-17 PROCEDURE — 80053 COMPREHEN METABOLIC PANEL: CPT

## 2024-04-17 PROCEDURE — 85025 COMPLETE CBC W/AUTO DIFF WBC: CPT

## 2024-04-17 PROCEDURE — 36415 COLL VENOUS BLD VENIPUNCTURE: CPT

## 2024-04-17 PROCEDURE — 82607 VITAMIN B-12: CPT

## 2024-04-17 PROCEDURE — 83918 ORGANIC ACIDS TOTAL QUANT: CPT

## 2024-04-23 LAB — METHYLMALONATE SERPL-SCNC: 70 NMOL/L (ref 0–378)

## 2024-04-24 NOTE — PROGRESS NOTES
HEMATOLOGY / ONCOLOGY CLINIC FOLLOW UP NOTE    Primary Care Provider: Margie Cervantes MD  Referring Provider:    MRN: [id number]  : 1960    Reason for Encounter: Follow-up for thrombocytosis       Interval History: Patient returns for follow-up visit for history of elevated platelet count.  At last visit, she was started on injectable B12 replacement for low normal serum B12 levels and symptoms she associated with B12 deficiency including sore tongue, generalized malaise and numbness and tingling in her legs.  She did receive 1 injection, however decided to discontinue this and lieu of taking an oral version of B12 instead.  She is taking methylcobalamin 1 mg daily  She reports she feels well.  No longer experiencing any significant symptoms she had prior.  Most recent blood work shows great improvement in her serum B12 level.  Platelet count remains elevated but stable.  No other concerning findings on CBC with differential or CMP.  She continues on a daily baby aspirin      REVIEW OF SYSTEMS:  Please note that a 14-point review of systems was performed to include Constitutional, HEENT, Respiratory, CVS, GI, , Musculoskeletal, Integumentary, Neurologic, Rheumatologic, Endocrinologic, Psychiatric, Lymphatic, and Hematologic/Oncologic systems were reviewed and are negative unless otherwise stated in HPI. Positive and negative findings pertinent to this evaluation are incorporated into the history of present illness.      ECOG PS: 0    PROBLEM LIST:  Patient Active Problem List   Diagnosis    Chest pain w/ palpitations     Abdominal pain    Urinary frequency    Right leg pain    Dizziness    Thrombocytosis    B12 deficiency       Assessment / Plan:  1. Thrombocytosis    2. B12 deficiency        Patient is a pleasant 63-year-old female with chronic thrombocytosis dating back to at least  on records.  Her baseline platelet count appears to run around 450 but has fluctuated as high as 550 in the past.   Hematologic workup was negative for myeloproliferative disorder.  No evidence of iron deficiency or evidence of inflammation.   Etiology for elevated platelet count unclear.  I suspect her mildly elevated platelet count is idiopathic.  She is taking a daily baby aspirin and should continue.      I will see her back in 6 months with repeat blood work.  Patient is in agreement.  She knows to call anytime with questions or concerns    I spent 20 minutes on chart review, face to face counseling time, coordination of care and documentation.    Past Medical History:   has a past medical history of BRCA1 negative, BRCA2 negative, GERD (gastroesophageal reflux disease), and Mitral valve prolapse.    PAST SURGICAL HISTORY:   has no past surgical history on file.    CURRENT MEDICATIONS  Current Outpatient Medications   Medication Sig Dispense Refill    aspirin (Adult Aspirin Regimen) 81 mg EC tablet       B-12 METHYLCOBALAMIN PO Take 1,000 mcg by mouth every other day      loratadine (Claritin) 10 mg tablet       cholecalciferol (VITAMIN D3) 1,000 units tablet Take 1,000 Units by mouth daily (Patient not taking: Reported on 4/25/2024)      diphenhydrAMINE (BENADRYL) 50 MG tablet Take 1 tablet (50 mg total) by mouth every 6 (six) hours as needed for itching or allergies (rash) 30 tablet 0    EPINEPHrine (EPIPEN) 0.3 mg/0.3 mL SOAJ Inject 0.3 mL (0.3 mg total) into a muscle once for 1 dose 0.6 mL 0    famotidine (PEPCID) 20 mg tablet Take 20 mg by mouth 2 (two) times a day (Patient not taking: Reported on 2/15/2023)      ibuprofen (MOTRIN) 600 mg tablet Take 1 tablet (600 mg total) by mouth every 8 (eight) hours for 5 days (Patient not taking: Reported on 2/15/2023) 30 tablet 0     No current facility-administered medications for this visit.     [unfilled]    SOCIAL HISTORY:   reports that she has quit smoking. She has never used smokeless tobacco. She reports current alcohol use. She reports that she does not use drugs.  "    FAMILY HISTORY:  family history includes Alcohol abuse in her paternal grandfather; Arthritis in her father; Breast cancer in her maternal aunt and maternal grandfather; Breast cancer (age of onset: 51) in her mother; Diabetes in her father; Heart attack in her maternal grandmother; Heart disease in her paternal grandmother; Lung cancer in her maternal grandfather; Lymphoma (age of onset: 77) in her mother; No Known Problems in her daughter, daughter, maternal aunt, paternal aunt, and sister; Skin cancer (age of onset: 60) in her brother; Stroke in her maternal grandmother.     ALLERGIES:  is allergic to codeine, latex, and other.      Physical Exam:  Vital Signs:   Visit Vitals  /92 (BP Location: Left arm, Patient Position: Sitting, Cuff Size: Standard)   Pulse (!) 114   Temp 98.2 °F (36.8 °C) (Temporal)   Ht 5' 3\" (1.6 m)   Wt 73.5 kg (162 lb)   SpO2 98%   BMI 28.70 kg/m²   OB Status Postmenopausal   Smoking Status Former   BSA 1.77 m²     Body mass index is 28.7 kg/m².  Body surface area is 1.77 meters squared.    Physical Exam  Constitutional:       General: She is not in acute distress.     Appearance: Normal appearance.   HENT:      Head: Normocephalic and atraumatic.   Eyes:      General: No scleral icterus.        Right eye: No discharge.         Left eye: No discharge.      Conjunctiva/sclera: Conjunctivae normal.   Cardiovascular:      Rate and Rhythm: Normal rate and regular rhythm.   Pulmonary:      Effort: Pulmonary effort is normal. No respiratory distress.      Breath sounds: Normal breath sounds.   Abdominal:      General: Bowel sounds are normal. There is no distension.      Palpations: Abdomen is soft. There is no mass.      Tenderness: There is no abdominal tenderness.   Musculoskeletal:         General: Normal range of motion.   Lymphadenopathy:      Cervical: No cervical adenopathy.      Upper Body:      Right upper body: No supraclavicular, axillary or pectoral adenopathy.      Left " upper body: No supraclavicular, axillary or pectoral adenopathy.   Skin:     General: Skin is warm and dry.   Neurological:      General: No focal deficit present.      Mental Status: She is alert and oriented to person, place, and time.   Psychiatric:         Mood and Affect: Mood normal.         Behavior: Behavior normal.         Labs:  Lab Results   Component Value Date    WBC 7.79 04/17/2024    HGB 15.2 04/17/2024    HCT 44.5 04/17/2024    MCV 98 04/17/2024     (H) 04/17/2024     Lab Results   Component Value Date    SODIUM 138 04/17/2024    K 4.8 04/17/2024     04/17/2024    CO2 29 04/17/2024    AGAP 5 04/17/2024    BUN 14 04/17/2024    CREATININE 0.76 04/17/2024    GLUC 110 10/30/2023    GLUF 131 (H) 04/17/2024    CALCIUM 10.2 04/17/2024    AST 21 04/17/2024    ALT 18 04/17/2024    ALKPHOS 73 04/17/2024    TP 8.0 04/17/2024    TBILI 0.46 04/17/2024    EGFR 84 04/17/2024

## 2024-04-25 ENCOUNTER — OFFICE VISIT (OUTPATIENT)
Age: 64
End: 2024-04-25
Payer: COMMERCIAL

## 2024-04-25 VITALS
SYSTOLIC BLOOD PRESSURE: 136 MMHG | DIASTOLIC BLOOD PRESSURE: 92 MMHG | TEMPERATURE: 98.2 F | BODY MASS INDEX: 28.7 KG/M2 | HEIGHT: 63 IN | WEIGHT: 162 LBS | HEART RATE: 114 BPM | OXYGEN SATURATION: 98 %

## 2024-04-25 DIAGNOSIS — E53.8 B12 DEFICIENCY: ICD-10-CM

## 2024-04-25 DIAGNOSIS — D75.839 THROMBOCYTOSIS: Primary | ICD-10-CM

## 2024-04-25 PROCEDURE — 99213 OFFICE O/P EST LOW 20 MIN: CPT | Performed by: NURSE PRACTITIONER

## 2024-04-25 RX ORDER — LORATADINE 10 MG/1
TABLET ORAL
COMMUNITY
Start: 2023-04-27

## 2024-10-22 ENCOUNTER — APPOINTMENT (OUTPATIENT)
Dept: LAB | Facility: HOSPITAL | Age: 64
End: 2024-10-22
Payer: COMMERCIAL

## 2024-10-22 DIAGNOSIS — E53.8 B12 DEFICIENCY: ICD-10-CM

## 2024-10-22 DIAGNOSIS — D75.839 THROMBOCYTOSIS: ICD-10-CM

## 2024-10-22 LAB
ALBUMIN SERPL BCG-MCNC: 4.5 G/DL (ref 3.5–5)
ALP SERPL-CCNC: 71 U/L (ref 34–104)
ALT SERPL W P-5'-P-CCNC: 19 U/L (ref 7–52)
ANION GAP SERPL CALCULATED.3IONS-SCNC: 8 MMOL/L (ref 4–13)
AST SERPL W P-5'-P-CCNC: 22 U/L (ref 13–39)
BASOPHILS # BLD AUTO: 0.13 THOUSANDS/ΜL (ref 0–0.1)
BASOPHILS NFR BLD AUTO: 2 % (ref 0–1)
BILIRUB SERPL-MCNC: 0.49 MG/DL (ref 0.2–1)
BUN SERPL-MCNC: 14 MG/DL (ref 5–25)
CALCIUM SERPL-MCNC: 10 MG/DL (ref 8.4–10.2)
CHLORIDE SERPL-SCNC: 105 MMOL/L (ref 96–108)
CO2 SERPL-SCNC: 29 MMOL/L (ref 21–32)
CREAT SERPL-MCNC: 0.71 MG/DL (ref 0.6–1.3)
EOSINOPHIL # BLD AUTO: 0.27 THOUSAND/ΜL (ref 0–0.61)
EOSINOPHIL NFR BLD AUTO: 4 % (ref 0–6)
ERYTHROCYTE [DISTWIDTH] IN BLOOD BY AUTOMATED COUNT: 11.8 % (ref 11.6–15.1)
GFR SERPL CREATININE-BSD FRML MDRD: 92 ML/MIN/1.73SQ M
GLUCOSE P FAST SERPL-MCNC: 105 MG/DL (ref 65–99)
HCT VFR BLD AUTO: 45.6 % (ref 34.8–46.1)
HGB BLD-MCNC: 15 G/DL (ref 11.5–15.4)
IMM GRANULOCYTES # BLD AUTO: 0.02 THOUSAND/UL (ref 0–0.2)
IMM GRANULOCYTES NFR BLD AUTO: 0 % (ref 0–2)
LYMPHOCYTES # BLD AUTO: 2.97 THOUSANDS/ΜL (ref 0.6–4.47)
LYMPHOCYTES NFR BLD AUTO: 39 % (ref 14–44)
MCH RBC QN AUTO: 32.8 PG (ref 26.8–34.3)
MCHC RBC AUTO-ENTMCNC: 32.9 G/DL (ref 31.4–37.4)
MCV RBC AUTO: 100 FL (ref 82–98)
MONOCYTES # BLD AUTO: 0.87 THOUSAND/ΜL (ref 0.17–1.22)
MONOCYTES NFR BLD AUTO: 11 % (ref 4–12)
NEUTROPHILS # BLD AUTO: 3.4 THOUSANDS/ΜL (ref 1.85–7.62)
NEUTS SEG NFR BLD AUTO: 44 % (ref 43–75)
NRBC BLD AUTO-RTO: 0 /100 WBCS
PLATELET # BLD AUTO: 509 THOUSANDS/UL (ref 149–390)
PMV BLD AUTO: 9.4 FL (ref 8.9–12.7)
POTASSIUM SERPL-SCNC: 4.1 MMOL/L (ref 3.5–5.3)
PROT SERPL-MCNC: 7.7 G/DL (ref 6.4–8.4)
RBC # BLD AUTO: 4.58 MILLION/UL (ref 3.81–5.12)
SODIUM SERPL-SCNC: 142 MMOL/L (ref 135–147)
VIT B12 SERPL-MCNC: 726 PG/ML (ref 180–914)
WBC # BLD AUTO: 7.66 THOUSAND/UL (ref 4.31–10.16)

## 2024-10-22 PROCEDURE — 85025 COMPLETE CBC W/AUTO DIFF WBC: CPT

## 2024-10-22 PROCEDURE — 82607 VITAMIN B-12: CPT

## 2024-10-22 PROCEDURE — 80053 COMPREHEN METABOLIC PANEL: CPT

## 2024-10-22 PROCEDURE — 36415 COLL VENOUS BLD VENIPUNCTURE: CPT

## 2024-10-24 ENCOUNTER — OFFICE VISIT (OUTPATIENT)
Age: 64
End: 2024-10-24
Payer: COMMERCIAL

## 2024-10-24 VITALS
RESPIRATION RATE: 16 BRPM | SYSTOLIC BLOOD PRESSURE: 150 MMHG | DIASTOLIC BLOOD PRESSURE: 86 MMHG | TEMPERATURE: 97.9 F | OXYGEN SATURATION: 97 % | BODY MASS INDEX: 28 KG/M2 | HEART RATE: 98 BPM | HEIGHT: 63 IN | WEIGHT: 158 LBS

## 2024-10-24 DIAGNOSIS — D75.839 THROMBOCYTOSIS: Primary | ICD-10-CM

## 2024-10-24 PROCEDURE — 99213 OFFICE O/P EST LOW 20 MIN: CPT | Performed by: NURSE PRACTITIONER

## 2024-10-24 NOTE — PROGRESS NOTES
HEMATOLOGY / ONCOLOGY CLINIC FOLLOW UP NOTE    Primary Care Provider: Margie Cervantes MD  Referring Provider:    MRN: [id number]  : 1960    Reason for Encounter: Follow-up for thrombocytosis       Interval History: Patient returns for follow-up visit for history of elevated platelet count.  She is doing well, no reported concerns.  Recent blood work shows isolated thrombocytosis, platelet count stable at 509.  No other concerns noted.  She is asymptomatic        REVIEW OF SYSTEMS:  Please note that a 14-point review of systems was performed to include Constitutional, HEENT, Respiratory, CVS, GI, , Musculoskeletal, Integumentary, Neurologic, Rheumatologic, Endocrinologic, Psychiatric, Lymphatic, and Hematologic/Oncologic systems were reviewed and are negative unless otherwise stated in HPI. Positive and negative findings pertinent to this evaluation are incorporated into the history of present illness.      ECOG PS: 0    PROBLEM LIST:  Patient Active Problem List   Diagnosis    Chest pain w/ palpitations     Abdominal pain    Urinary frequency    Right leg pain    Dizziness    Thrombocytosis    B12 deficiency       Assessment / Plan:  1. Thrombocytosis      Patient is a pleasant 63-year-old female with chronic thrombocytosis dating back to at least  on records.  Her baseline platelet count appears to run around 450 but has fluctuated as high as 550 in the past.  Hematologic workup was negative for myeloproliferative disorder.  No evidence of iron deficiency or evidence of inflammation.   Etiology for elevated platelet count unclear.  I suspect her mildly elevated platelet count is idiopathic.  She is taking a daily baby aspirin and should continue.    Due to stability in her labs, I will see her on a yearly basis.  She will return for a follow-up visit in 1 year with repeat blood work.  Patient is in agreement.  She knows to call anytime with questions or concerns    I spent 20 minutes on chart review,  face to face counseling time, coordination of care and documentation.    Past Medical History:   has a past medical history of BRCA1 negative, BRCA2 negative, GERD (gastroesophageal reflux disease), and Mitral valve prolapse.    PAST SURGICAL HISTORY:   has no past surgical history on file.    CURRENT MEDICATIONS  Current Outpatient Medications   Medication Sig Dispense Refill    aspirin (Adult Aspirin Regimen) 81 mg EC tablet       B-12 METHYLCOBALAMIN PO Take 1,000 mcg by mouth every other day      loratadine (Claritin) 10 mg tablet       cholecalciferol (VITAMIN D3) 1,000 units tablet Take 1,000 Units by mouth daily (Patient not taking: Reported on 4/25/2024)      diphenhydrAMINE (BENADRYL) 50 MG tablet Take 1 tablet (50 mg total) by mouth every 6 (six) hours as needed for itching or allergies (rash) 30 tablet 0    EPINEPHrine (EPIPEN) 0.3 mg/0.3 mL SOAJ Inject 0.3 mL (0.3 mg total) into a muscle once for 1 dose 0.6 mL 0    famotidine (PEPCID) 20 mg tablet Take 20 mg by mouth 2 (two) times a day (Patient not taking: Reported on 2/15/2023)      ibuprofen (MOTRIN) 600 mg tablet Take 1 tablet (600 mg total) by mouth every 8 (eight) hours for 5 days (Patient not taking: Reported on 2/15/2023) 30 tablet 0     No current facility-administered medications for this visit.     [unfilled]    SOCIAL HISTORY:   reports that she has quit smoking. She has never used smokeless tobacco. She reports current alcohol use. She reports that she does not use drugs.     FAMILY HISTORY:  family history includes Alcohol abuse in her paternal grandfather; Arthritis in her father; Breast cancer in her maternal aunt and maternal grandfather; Breast cancer (age of onset: 51) in her mother; Diabetes in her father; Heart attack in her maternal grandmother; Heart disease in her paternal grandmother; Lung cancer in her maternal grandfather; Lymphoma (age of onset: 77) in her mother; No Known Problems in her daughter, daughter, maternal aunt,  "paternal aunt, and sister; Skin cancer (age of onset: 60) in her brother; Stroke in her maternal grandmother.     ALLERGIES:  is allergic to codeine, latex, and other.      Physical Exam:  Vital Signs:   Visit Vitals  /86 (BP Location: Right arm, Patient Position: Sitting, Cuff Size: Standard)   Pulse 98   Temp 97.9 °F (36.6 °C) (Temporal)   Resp 16   Ht 5' 3\" (1.6 m)   Wt 71.7 kg (158 lb)   SpO2 97%   BMI 27.99 kg/m²   OB Status Postmenopausal   Smoking Status Former   BSA 1.75 m²     Body mass index is 27.99 kg/m².  Body surface area is 1.75 meters squared.    Physical Exam  Constitutional:       General: She is not in acute distress.     Appearance: Normal appearance.   HENT:      Head: Normocephalic and atraumatic.   Eyes:      General: No scleral icterus.        Right eye: No discharge.         Left eye: No discharge.      Conjunctiva/sclera: Conjunctivae normal.   Cardiovascular:      Rate and Rhythm: Normal rate and regular rhythm.   Pulmonary:      Effort: Pulmonary effort is normal. No respiratory distress.      Breath sounds: Normal breath sounds.   Abdominal:      General: Bowel sounds are normal. There is no distension.      Palpations: Abdomen is soft. There is no mass.      Tenderness: There is no abdominal tenderness.   Musculoskeletal:         General: Normal range of motion.   Lymphadenopathy:      Cervical: No cervical adenopathy.      Upper Body:      Right upper body: No supraclavicular, axillary or pectoral adenopathy.      Left upper body: No supraclavicular, axillary or pectoral adenopathy.   Skin:     General: Skin is warm and dry.   Neurological:      General: No focal deficit present.      Mental Status: She is alert and oriented to person, place, and time.   Psychiatric:         Mood and Affect: Mood normal.         Behavior: Behavior normal.         Labs:  Lab Results   Component Value Date    WBC 7.66 10/22/2024    HGB 15.0 10/22/2024    HCT 45.6 10/22/2024     (H) " 10/22/2024     (H) 10/22/2024     Lab Results   Component Value Date    SODIUM 142 10/22/2024    K 4.1 10/22/2024     10/22/2024    CO2 29 10/22/2024    AGAP 8 10/22/2024    BUN 14 10/22/2024    CREATININE 0.71 10/22/2024    GLUC 110 10/30/2023    GLUF 105 (H) 10/22/2024    CALCIUM 10.0 10/22/2024    AST 22 10/22/2024    ALT 19 10/22/2024    ALKPHOS 71 10/22/2024    TP 7.7 10/22/2024    TBILI 0.49 10/22/2024    EGFR 92 10/22/2024

## 2025-05-05 ENCOUNTER — HOSPITAL ENCOUNTER (EMERGENCY)
Facility: HOSPITAL | Age: 65
Discharge: HOME | End: 2025-05-05
Attending: EMERGENCY MEDICINE
Payer: COMMERCIAL

## 2025-05-05 VITALS
TEMPERATURE: 98.1 F | WEIGHT: 159 LBS | DIASTOLIC BLOOD PRESSURE: 99 MMHG | SYSTOLIC BLOOD PRESSURE: 165 MMHG | HEIGHT: 63 IN | RESPIRATION RATE: 18 BRPM | BODY MASS INDEX: 28.17 KG/M2 | OXYGEN SATURATION: 99 % | HEART RATE: 81 BPM

## 2025-05-05 DIAGNOSIS — K08.89 PAIN, DENTAL: Primary | ICD-10-CM

## 2025-05-05 DIAGNOSIS — K02.9 DENTAL CARIES: ICD-10-CM

## 2025-05-05 PROCEDURE — 99284 EMERGENCY DEPT VISIT MOD MDM: CPT | Performed by: EMERGENCY MEDICINE

## 2025-05-05 PROCEDURE — 99282 EMERGENCY DEPT VISIT SF MDM: CPT

## 2025-05-05 RX ORDER — AMOXICILLIN 500 MG/1
500 CAPSULE ORAL EVERY 12 HOURS SCHEDULED
Qty: 14 CAPSULE | Refills: 0 | Status: SHIPPED | OUTPATIENT
Start: 2025-05-05 | End: 2025-05-12

## 2025-05-05 NOTE — ED PROVIDER NOTES
Time reflects when diagnosis was documented in both MDM as applicable and the Disposition within this note       Time User Action Codes Description Comment    5/5/2025 10:24 AM Faheem Ace [K08.89] Pain, dental     5/5/2025 10:24 AM Faheem Ace [K02.9] Dental caries           ED Disposition       ED Disposition   Discharge    Condition   Stable    Date/Time   Mon May 5, 2025 10:24 AM    Comment   Neris Teran discharge to home/self care.                   Assessment & Plan       Medical Decision Making     Reviewed past medical records: Yes no recent admissions noted.     History Provided by: Patient,      Differential considered: Dental fracture, dislocation, abscess, dental carry.  Albin's angina.  Peritonsillar abscess.     Consideration of tests: Right inferior posterior molar has a dental, however there appears to be erosions in the center of the.  Likely has dentalgia secondary to nerve irritation with questionable source being infection.  Will cover with antibiotics.  Needs follow-up dentures.  Strict return precautions reviewed.       I have reviewed the patient's visit and any testing done in the emergency department.  They have verbalized their understanding of any testing done today and have no further questions or concerns regarding their care in the emergency room.  They will follow up with their primary care physician as well as with any specialist in their discharge instructions.  Strict return precautions were discussed.    Risk  Prescription drug management.             Medications - No data to display    ED Risk Strat Scores                    No data recorded        SBIRT 22yo+      Flowsheet Row Most Recent Value   Initial Alcohol Screen: US AUDIT-C     1. How often do you have a drink containing alcohol? 3 Filed at: 05/05/2025 1012   2. How many drinks containing alcohol do you have on a typical day you are drinking?  1 Filed at: 05/05/2025 1012   3b. FEMALE Any  Age, or MALE 65+: How often do you have 4 or more drinks on one occassion? 0 Filed at: 05/05/2025 1012   Audit-C Score 4 Filed at: 05/05/2025 1012   VJ: How many times in the past year have you...    Used an illegal drug or used a prescription medication for non-medical reasons? Never Filed at: 05/05/2025 1012                            History of Present Illness       Chief Complaint   Patient presents with    Dental Pain     Patient presents to the ER with pain in right, lower jaw due to a bad crown on her tooth.  Patient concerned as she has a new dentist and does have an issue with her mitral valve.       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 No pertinent surgical history.   Family History   Problem Relation Age of Onset    Lymphoma Mother 77    Breast cancer Mother 51    Diabetes Father     Arthritis Father     No Known Problems Sister     No Known Problems Daughter     No Known Problems Daughter     Heart attack Maternal Grandmother     Stroke Maternal Grandmother     Breast cancer Maternal Grandfather         estimate 72    Lung cancer Maternal Grandfather         Early 70's    Heart disease Paternal Grandmother     Alcohol abuse Paternal Grandfather     Breast cancer Maternal Aunt         guessing early 40's    No Known Problems Maternal Aunt     No Known Problems Paternal Aunt     Skin cancer Brother 60      Social History     Tobacco Use    Smoking status: Former    Smokeless tobacco: Never    Tobacco comments:     quit 34 years ago   Vaping Use    Vaping status: Never Used   Substance Use Topics    Alcohol use: Yes     Comment: socially    Drug use: Never      E-Cigarette/Vaping    E-Cigarette Use Never User       E-Cigarette/Vaping Substances    Nicotine No     THC No     CBD No     Flavoring No       I have reviewed and agree with the history as documented.     Patient  presents with:  Dental Pain: Patient presents to the ER with pain in right, lower jaw due to a bad crown on her tooth.  Patient concerned as she has a new dentist and does have an issue with her mitral valve.    Patient states she has known problems with her right posterior molar.  Has root canal in the past as well as having a crown placed.  Has been having pain for the last several days.      Dental Pain  Associated symptoms: no fever and no neck pain        Review of Systems   Constitutional: Negative.  Negative for chills and fever.   HENT:  Positive for dental problem. Negative for rhinorrhea, sore throat, trouble swallowing and voice change.    Eyes: Negative.  Negative for pain and visual disturbance.   Respiratory: Negative.  Negative for cough, shortness of breath and wheezing.    Cardiovascular: Negative.  Negative for chest pain and palpitations.   Gastrointestinal:  Negative for abdominal pain, diarrhea, nausea and vomiting.   Genitourinary: Negative.  Negative for dysuria and frequency.   Musculoskeletal: Negative.  Negative for neck pain and neck stiffness.   Skin: Negative.  Negative for rash.   Neurological: Negative.  Negative for dizziness, speech difficulty, weakness, light-headedness and numbness.           Objective       ED Triage Vitals [05/05/25 1009]   Temperature Pulse Blood Pressure Respirations SpO2 Patient Position - Orthostatic VS   98.1 °F (36.7 °C) 86 (!) 179/90 18 99 % Lying      Temp Source Heart Rate Source BP Location FiO2 (%) Pain Score    Oral Monitor Right arm -- 10 - Worst Possible Pain      Vitals      Date and Time Temp Pulse SpO2 Resp BP Pain Score FACES Pain Rating User   05/05/25 1015 -- 81 99 % 18 165/99 -- -- AM   05/05/25 1009 98.1 °F (36.7 °C) 86 99 % 18 179/90 10 - Worst Possible Pain -- CAC            Physical Exam  Vitals and nursing note reviewed.   Constitutional:       General: She is not in acute distress.     Appearance: She is well-developed.   HENT:       Head: Normocephalic and atraumatic.      Mouth/Throat:     Eyes:      Conjunctiva/sclera: Conjunctivae normal.      Pupils: Pupils are equal, round, and reactive to light.   Neck:      Trachea: No tracheal deviation.   Cardiovascular:      Rate and Rhythm: Normal rate and regular rhythm.   Pulmonary:      Effort: Pulmonary effort is normal. No respiratory distress.      Breath sounds: Normal breath sounds. No wheezing or rales.   Abdominal:      General: Bowel sounds are normal. There is no distension.      Palpations: Abdomen is soft.      Tenderness: There is no abdominal tenderness. There is no guarding or rebound.   Musculoskeletal:         General: No tenderness or deformity. Normal range of motion.      Cervical back: Normal range of motion and neck supple.   Skin:     General: Skin is warm and dry.      Capillary Refill: Capillary refill takes less than 2 seconds.      Findings: No rash.   Neurological:      Mental Status: She is alert and oriented to person, place, and time.   Psychiatric:         Behavior: Behavior normal.         Results Reviewed       None            No orders to display       Procedures    ED Medication and Procedure Management   Prior to Admission Medications   Prescriptions Last Dose Informant Patient Reported? Taking?   B-12 METHYLCOBALAMIN PO   Yes No   Sig: Take 1,000 mcg by mouth every other day   EPINEPHrine (EPIPEN) 0.3 mg/0.3 mL SOAJ   No No   Sig: Inject 0.3 mL (0.3 mg total) into a muscle once for 1 dose   aspirin (Adult Aspirin Regimen) 81 mg EC tablet   Yes No   cholecalciferol (VITAMIN D3) 1,000 units tablet  Self Yes No   Sig: Take 1,000 Units by mouth daily   Patient not taking: Reported on 4/25/2024   diphenhydrAMINE (BENADRYL) 50 MG tablet   No No   Sig: Take 1 tablet (50 mg total) by mouth every 6 (six) hours as needed for itching or allergies (rash)   famotidine (PEPCID) 20 mg tablet  Self Yes No   Sig: Take 20 mg by mouth 2 (two) times a day   Patient not taking:  Reported on 2/15/2023   ibuprofen (MOTRIN) 600 mg tablet   No No   Sig: Take 1 tablet (600 mg total) by mouth every 8 (eight) hours for 5 days   Patient not taking: Reported on 2/15/2023   loratadine (Claritin) 10 mg tablet   Yes No      Facility-Administered Medications: None     Discharge Medication List as of 5/5/2025 10:26 AM        START taking these medications    Details   amoxicillin (AMOXIL) 500 mg capsule Take 1 capsule (500 mg total) by mouth every 12 (twelve) hours for 7 days, Starting Mon 5/5/2025, Until Mon 5/12/2025, Normal           CONTINUE these medications which have NOT CHANGED    Details   aspirin (Adult Aspirin Regimen) 81 mg EC tablet Historical Med      B-12 METHYLCOBALAMIN PO Take 1,000 mcg by mouth every other day, Historical Med      cholecalciferol (VITAMIN D3) 1,000 units tablet Take 1,000 Units by mouth daily, Historical Med      diphenhydrAMINE (BENADRYL) 50 MG tablet Take 1 tablet (50 mg total) by mouth every 6 (six) hours as needed for itching or allergies (rash), Starting Thu 10/12/2023, Normal      EPINEPHrine (EPIPEN) 0.3 mg/0.3 mL SOAJ Inject 0.3 mL (0.3 mg total) into a muscle once for 1 dose, Starting Thu 10/12/2023, Normal      famotidine (PEPCID) 20 mg tablet Take 20 mg by mouth 2 (two) times a day, Historical Med      ibuprofen (MOTRIN) 600 mg tablet Take 1 tablet (600 mg total) by mouth every 8 (eight) hours for 5 days, Starting Mon 1/11/2021, Until Sat 1/16/2021, No Print      loratadine (Claritin) 10 mg tablet Historical Med           No discharge procedures on file.  ED SEPSIS DOCUMENTATION   Time reflects when diagnosis was documented in both MDM as applicable and the Disposition within this note       Time User Action Codes Description Comment    5/5/2025 10:24 AM Faheem Ace [K08.89] Pain, dental     5/5/2025 10:24 AM Faheem Ace [K02.9] Dental caries                  Faheem Ace DO  05/05/25 7252

## 2025-05-07 ENCOUNTER — TELEPHONE (OUTPATIENT)
Age: 65
End: 2025-05-07

## 2025-05-07 NOTE — TELEPHONE ENCOUNTER
Pt's appt needs to be rescheduled d/t provider not being in the office. Attempted to reach patient about need of appointment change with no success. Detailed VM left with [facility] number [phone] for patient to return call to reschedule.     Additional message sent Via [employer].

## 2025-06-09 ENCOUNTER — HOSPITAL ENCOUNTER (OUTPATIENT)
Dept: MAMMOGRAPHY | Facility: IMAGING CENTER | Age: 65
Discharge: HOME | End: 2025-06-09
Payer: COMMERCIAL

## 2025-06-09 VITALS — WEIGHT: 158 LBS | HEIGHT: 63 IN | BODY MASS INDEX: 28 KG/M2

## 2025-06-09 DIAGNOSIS — Z12.31 ENCOUNTER FOR SCREENING MAMMOGRAM FOR MALIGNANT NEOPLASM OF BREAST: ICD-10-CM

## 2025-06-09 PROCEDURE — 77063 BREAST TOMOSYNTHESIS BI: CPT

## 2025-06-09 PROCEDURE — 77067 SCR MAMMO BI INCL CAD: CPT
